# Patient Record
Sex: MALE | Race: WHITE | NOT HISPANIC OR LATINO | Employment: OTHER | URBAN - METROPOLITAN AREA
[De-identification: names, ages, dates, MRNs, and addresses within clinical notes are randomized per-mention and may not be internally consistent; named-entity substitution may affect disease eponyms.]

---

## 2023-01-19 PROBLEM — N42.9 PROSTATE DISORDER: Status: ACTIVE | Noted: 2023-01-19

## 2023-01-19 PROBLEM — N28.9 ABNORMAL RENAL FUNCTION: Status: ACTIVE | Noted: 2023-01-19

## 2023-01-19 PROBLEM — Z90.5 HISTORY OF NEPHRECTOMY: Status: ACTIVE | Noted: 2023-01-19

## 2023-01-19 PROBLEM — E78.00 HYPERCHOLESTEROLEMIA: Status: ACTIVE | Noted: 2023-01-19

## 2023-01-19 PROBLEM — J45.909 REACTIVE AIRWAY DISEASE (HHS-HCC): Status: ACTIVE | Noted: 2023-01-19

## 2023-01-19 PROBLEM — D75.89 MACROCYTOSIS: Status: ACTIVE | Noted: 2023-01-19

## 2023-01-19 PROBLEM — J20.9 ACUTE BRONCHITIS: Status: ACTIVE | Noted: 2023-01-19

## 2023-01-19 PROBLEM — I25.10 ARTERIOSCLEROSIS OF CORONARY ARTERY: Status: ACTIVE | Noted: 2023-01-19

## 2023-01-19 PROBLEM — R79.9 ABNORMAL BLOOD CHEMISTRY: Status: ACTIVE | Noted: 2023-01-19

## 2023-01-19 PROBLEM — N18.30 CHRONIC RENAL IMPAIRMENT, STAGE 3 (MODERATE) (MULTI): Status: ACTIVE | Noted: 2023-01-19

## 2023-01-19 PROBLEM — D64.9 ANEMIA: Status: ACTIVE | Noted: 2023-01-19

## 2023-01-19 PROBLEM — M10.9 ACUTE GOUT OF LEFT FOOT: Status: ACTIVE | Noted: 2023-01-19

## 2023-01-19 PROBLEM — S76.912A MUSCLE STRAIN OF THIGH, LEFT, INITIAL ENCOUNTER: Status: ACTIVE | Noted: 2023-01-19

## 2023-01-19 PROBLEM — M10.9 GOUT: Status: ACTIVE | Noted: 2023-01-19

## 2023-01-19 PROBLEM — N18.9 CRI (CHRONIC RENAL INSUFFICIENCY): Status: ACTIVE | Noted: 2023-01-19

## 2023-01-19 PROBLEM — I10 HYPERTENSION: Status: ACTIVE | Noted: 2023-01-19

## 2023-01-19 PROBLEM — M62.9 MUSCLE DISORDER: Status: ACTIVE | Noted: 2023-01-19

## 2023-01-19 PROBLEM — J06.9 VIRAL URI WITH COUGH: Status: ACTIVE | Noted: 2023-01-19

## 2023-01-19 PROBLEM — E55.9 VITAMIN D DEFICIENCY: Status: ACTIVE | Noted: 2023-01-19

## 2023-01-19 PROBLEM — I49.3 PVC (PREMATURE VENTRICULAR CONTRACTION): Status: ACTIVE | Noted: 2023-01-19

## 2023-01-19 PROBLEM — G40.909 SEIZURE DISORDER (MULTI): Status: ACTIVE | Noted: 2023-01-19

## 2023-01-19 PROBLEM — S51.819A FOREARM LACERATION: Status: ACTIVE | Noted: 2023-01-19

## 2023-01-19 PROBLEM — T14.8XXA MUSCLE TEAR: Status: ACTIVE | Noted: 2023-01-19

## 2023-01-19 PROBLEM — I49.9 ARRHYTHMIA, VENTRICULAR: Status: ACTIVE | Noted: 2023-01-19

## 2023-01-19 PROBLEM — E87.5 HYPERKALEMIA: Status: ACTIVE | Noted: 2023-01-19

## 2023-01-19 PROBLEM — I05.9 MITRAL VALVE DISEASE: Status: ACTIVE | Noted: 2023-01-19

## 2023-01-19 RX ORDER — PHENOL 1.4 %
1 AEROSOL, SPRAY (ML) MUCOUS MEMBRANE DAILY
COMMUNITY
Start: 2019-06-19

## 2023-01-19 RX ORDER — ACETAMINOPHEN 500 MG
500 TABLET ORAL AS NEEDED
COMMUNITY
Start: 2018-06-20

## 2023-01-19 RX ORDER — AMLODIPINE BESYLATE 5 MG/1
1 TABLET ORAL DAILY
COMMUNITY
Start: 2022-09-06 | End: 2024-02-23 | Stop reason: SDUPTHER

## 2023-01-19 RX ORDER — OXCARBAZEPINE 300 MG/1
TABLET, FILM COATED ORAL
COMMUNITY

## 2023-01-19 RX ORDER — ATORVASTATIN CALCIUM 80 MG/1
1 TABLET, FILM COATED ORAL NIGHTLY
COMMUNITY
Start: 2022-09-06 | End: 2024-02-23 | Stop reason: SDUPTHER

## 2023-02-22 PROBLEM — T14.8XXA WOUND OF SKIN: Status: ACTIVE | Noted: 2023-02-22

## 2023-02-22 RX ORDER — EZETIMIBE 10 MG/1
10 TABLET ORAL
COMMUNITY
End: 2024-02-20 | Stop reason: SDUPTHER

## 2023-02-22 RX ORDER — LISINOPRIL 5 MG/1
5 TABLET ORAL DAILY
COMMUNITY
End: 2023-11-21 | Stop reason: ALTCHOICE

## 2023-03-06 ENCOUNTER — APPOINTMENT (OUTPATIENT)
Dept: PRIMARY CARE | Facility: CLINIC | Age: 78
End: 2023-03-06
Payer: MEDICARE

## 2023-04-21 LAB
ALANINE AMINOTRANSFERASE (SGPT) (U/L) IN SER/PLAS: 34 U/L (ref 10–52)
ALBUMIN (G/DL) IN SER/PLAS: 4.1 G/DL (ref 3.4–5)
ALKALINE PHOSPHATASE (U/L) IN SER/PLAS: 63 U/L (ref 33–136)
ANION GAP IN SER/PLAS: 13 MMOL/L (ref 10–20)
APPEARANCE, URINE: CLEAR
ASPARTATE AMINOTRANSFERASE (SGOT) (U/L) IN SER/PLAS: 35 U/L (ref 9–39)
BASOPHILS (10*3/UL) IN BLOOD BY AUTOMATED COUNT: 0.05 X10E9/L (ref 0–0.1)
BASOPHILS/100 LEUKOCYTES IN BLOOD BY AUTOMATED COUNT: 0.8 % (ref 0–2)
BILIRUBIN TOTAL (MG/DL) IN SER/PLAS: 0.6 MG/DL (ref 0–1.2)
BILIRUBIN, URINE: NEGATIVE
BLOOD, URINE: NEGATIVE
CALCIDIOL (25 OH VITAMIN D3) (NG/ML) IN SER/PLAS: 47 NG/ML
CALCIUM (MG/DL) IN SER/PLAS: 9.3 MG/DL (ref 8.6–10.6)
CARBON DIOXIDE, TOTAL (MMOL/L) IN SER/PLAS: 27 MMOL/L (ref 21–32)
CHLORIDE (MMOL/L) IN SER/PLAS: 106 MMOL/L (ref 98–107)
CHOLESTEROL (MG/DL) IN SER/PLAS: 139 MG/DL (ref 0–199)
CHOLESTEROL IN HDL (MG/DL) IN SER/PLAS: 81.3 MG/DL
CHOLESTEROL/HDL RATIO: 1.7
COBALAMIN (VITAMIN B12) (PG/ML) IN SER/PLAS: 653 PG/ML (ref 211–911)
COLOR, URINE: YELLOW
CREATININE (MG/DL) IN SER/PLAS: 1.82 MG/DL (ref 0.5–1.3)
EOSINOPHILS (10*3/UL) IN BLOOD BY AUTOMATED COUNT: 0.12 X10E9/L (ref 0–0.4)
EOSINOPHILS/100 LEUKOCYTES IN BLOOD BY AUTOMATED COUNT: 2 % (ref 0–6)
ERYTHROCYTE DISTRIBUTION WIDTH (RATIO) BY AUTOMATED COUNT: 12.9 % (ref 11.5–14.5)
ERYTHROCYTE MEAN CORPUSCULAR HEMOGLOBIN CONCENTRATION (G/DL) BY AUTOMATED: 33 G/DL (ref 32–36)
ERYTHROCYTE MEAN CORPUSCULAR VOLUME (FL) BY AUTOMATED COUNT: 101 FL (ref 80–100)
ERYTHROCYTES (10*6/UL) IN BLOOD BY AUTOMATED COUNT: 4.02 X10E12/L (ref 4.5–5.9)
ESTIMATED AVERAGE GLUCOSE FOR HBA1C: 108 MG/DL
FERRITIN (UG/LL) IN SER/PLAS: 119 UG/L (ref 20–300)
GFR MALE: 38 ML/MIN/1.73M2
GLUCOSE (MG/DL) IN SER/PLAS: 88 MG/DL (ref 74–99)
GLUCOSE, URINE: NEGATIVE MG/DL
HEMATOCRIT (%) IN BLOOD BY AUTOMATED COUNT: 40.6 % (ref 41–52)
HEMOGLOBIN (G/DL) IN BLOOD: 13.4 G/DL (ref 13.5–17.5)
HEMOGLOBIN A1C/HEMOGLOBIN TOTAL IN BLOOD: 5.4 %
IMMATURE GRANULOCYTES/100 LEUKOCYTES IN BLOOD BY AUTOMATED COUNT: 0.2 % (ref 0–0.9)
IRON (UG/DL) IN SER/PLAS: 105 UG/DL (ref 35–150)
IRON BINDING CAPACITY (UG/DL) IN SER/PLAS: 272 UG/DL (ref 240–445)
IRON SATURATION (%) IN SER/PLAS: 39 % (ref 25–45)
KETONES, URINE: NEGATIVE MG/DL
LDL: 41 MG/DL (ref 0–99)
LEUKOCYTE ESTERASE, URINE: NEGATIVE
LEUKOCYTES (10*3/UL) IN BLOOD BY AUTOMATED COUNT: 6 X10E9/L (ref 4.4–11.3)
LYMPHOCYTES (10*3/UL) IN BLOOD BY AUTOMATED COUNT: 1.53 X10E9/L (ref 0.8–3)
LYMPHOCYTES/100 LEUKOCYTES IN BLOOD BY AUTOMATED COUNT: 25.4 % (ref 13–44)
MONOCYTES (10*3/UL) IN BLOOD BY AUTOMATED COUNT: 0.45 X10E9/L (ref 0.05–0.8)
MONOCYTES/100 LEUKOCYTES IN BLOOD BY AUTOMATED COUNT: 7.5 % (ref 2–10)
MUCUS, URINE: NORMAL /LPF
NEUTROPHILS (10*3/UL) IN BLOOD BY AUTOMATED COUNT: 3.86 X10E9/L (ref 1.6–5.5)
NEUTROPHILS/100 LEUKOCYTES IN BLOOD BY AUTOMATED COUNT: 64.1 % (ref 40–80)
NITRITE, URINE: NEGATIVE
NRBC (PER 100 WBCS) BY AUTOMATED COUNT: 0 /100 WBC (ref 0–0)
PH, URINE: 5 (ref 5–8)
PLATELETS (10*3/UL) IN BLOOD AUTOMATED COUNT: 235 X10E9/L (ref 150–450)
POTASSIUM (MMOL/L) IN SER/PLAS: 4.4 MMOL/L (ref 3.5–5.3)
PROSTATE SPECIFIC ANTIGEN,SCREEN: 1.06 NG/ML (ref 0–4)
PROTEIN TOTAL: 6.3 G/DL (ref 6.4–8.2)
PROTEIN, URINE: ABNORMAL MG/DL
RBC, URINE: NORMAL /HPF (ref 0–5)
SODIUM (MMOL/L) IN SER/PLAS: 142 MMOL/L (ref 136–145)
SPECIFIC GRAVITY, URINE: 1.01 (ref 1–1.03)
THYROTROPIN (MIU/L) IN SER/PLAS BY DETECTION LIMIT <= 0.05 MIU/L: 1.41 MIU/L (ref 0.44–3.98)
TRIGLYCERIDE (MG/DL) IN SER/PLAS: 82 MG/DL (ref 0–149)
UREA NITROGEN (MG/DL) IN SER/PLAS: 27 MG/DL (ref 6–23)
UROBILINOGEN, URINE: <2 MG/DL (ref 0–1.9)
VLDL: 16 MG/DL (ref 0–40)
WBC, URINE: 2 /HPF (ref 0–5)

## 2023-05-17 ENCOUNTER — TELEPHONE (OUTPATIENT)
Dept: PRIMARY CARE | Facility: CLINIC | Age: 78
End: 2023-05-17

## 2023-05-24 LAB
ALBUMIN (G/DL) IN SER/PLAS: 4.2 G/DL (ref 3.4–5)
ANION GAP IN SER/PLAS: 13 MMOL/L (ref 10–20)
APPEARANCE, URINE: CLEAR
BILIRUBIN, URINE: NEGATIVE
BLOOD, URINE: NEGATIVE
CALCIUM (MG/DL) IN SER/PLAS: 9.7 MG/DL (ref 8.6–10.6)
CARBON DIOXIDE, TOTAL (MMOL/L) IN SER/PLAS: 28 MMOL/L (ref 21–32)
CHLORIDE (MMOL/L) IN SER/PLAS: 106 MMOL/L (ref 98–107)
CHOLESTEROL (MG/DL) IN SER/PLAS: 149 MG/DL (ref 0–199)
CHOLESTEROL IN HDL (MG/DL) IN SER/PLAS: 101.3 MG/DL
CHOLESTEROL/HDL RATIO: 1.5
COLOR, URINE: YELLOW
CREATININE (MG/DL) IN SER/PLAS: 1.9 MG/DL (ref 0.5–1.3)
GFR MALE: 36 ML/MIN/1.73M2
GLUCOSE (MG/DL) IN SER/PLAS: 107 MG/DL (ref 74–99)
GLUCOSE, URINE: NEGATIVE MG/DL
KETONES, URINE: NEGATIVE MG/DL
LDL: 42 MG/DL (ref 0–99)
LEUKOCYTE ESTERASE, URINE: NEGATIVE
MUCUS, URINE: NORMAL /LPF
NITRITE, URINE: NEGATIVE
PH, URINE: 6 (ref 5–8)
PHOSPHATE (MG/DL) IN SER/PLAS: 4.5 MG/DL (ref 2.5–4.9)
POTASSIUM (MMOL/L) IN SER/PLAS: 4.9 MMOL/L (ref 3.5–5.3)
PROTEIN, URINE: ABNORMAL MG/DL
RBC, URINE: NORMAL /HPF (ref 0–5)
SODIUM (MMOL/L) IN SER/PLAS: 142 MMOL/L (ref 136–145)
SPECIFIC GRAVITY, URINE: 1.01 (ref 1–1.03)
TRIGLYCERIDE (MG/DL) IN SER/PLAS: 30 MG/DL (ref 0–149)
UREA NITROGEN (MG/DL) IN SER/PLAS: 37 MG/DL (ref 6–23)
UROBILINOGEN, URINE: <2 MG/DL (ref 0–1.9)
VLDL: 6 MG/DL (ref 0–40)
WBC, URINE: <1 /HPF (ref 0–5)

## 2023-09-22 LAB
ALBUMIN (G/DL) IN SER/PLAS: 4.2 G/DL (ref 3.4–5)
ALBUMIN (MG/L) IN URINE: 184.3 MG/L
ALBUMIN/CREATININE (UG/MG) IN URINE: 121.3 UG/MG CRT (ref 0–30)
ANION GAP IN SER/PLAS: 16 MMOL/L (ref 10–20)
APPEARANCE, URINE: CLEAR
BILIRUBIN, URINE: NEGATIVE
BLOOD, URINE: NEGATIVE
CALCIUM (MG/DL) IN SER/PLAS: 9.7 MG/DL (ref 8.6–10.6)
CARBON DIOXIDE, TOTAL (MMOL/L) IN SER/PLAS: 27 MMOL/L (ref 21–32)
CHLORIDE (MMOL/L) IN SER/PLAS: 106 MMOL/L (ref 98–107)
COLOR, URINE: ABNORMAL
CREATININE (MG/DL) IN SER/PLAS: 1.77 MG/DL (ref 0.5–1.3)
CREATININE (MG/DL) IN URINE: 152 MG/DL (ref 20–370)
GFR MALE: 39 ML/MIN/1.73M2
GLUCOSE (MG/DL) IN SER/PLAS: 115 MG/DL (ref 74–99)
GLUCOSE, URINE: NEGATIVE MG/DL
KETONES, URINE: NEGATIVE MG/DL
LEUKOCYTE ESTERASE, URINE: NEGATIVE
MUCUS, URINE: NORMAL /LPF
NITRITE, URINE: NEGATIVE
PH, URINE: 5 (ref 5–8)
PHOSPHATE (MG/DL) IN SER/PLAS: 3.1 MG/DL (ref 2.5–4.9)
POTASSIUM (MMOL/L) IN SER/PLAS: 4.7 MMOL/L (ref 3.5–5.3)
PROTEIN, URINE: ABNORMAL MG/DL
RBC, URINE: NORMAL /HPF (ref 0–5)
SODIUM (MMOL/L) IN SER/PLAS: 144 MMOL/L (ref 136–145)
SPECIFIC GRAVITY, URINE: 1.02 (ref 1–1.03)
SQUAMOUS EPITHELIAL CELLS, URINE: <1 /HPF
UREA NITROGEN (MG/DL) IN SER/PLAS: 28 MG/DL (ref 6–23)
UROBILINOGEN, URINE: <2 MG/DL (ref 0–1.9)
WBC, URINE: 1 /HPF (ref 0–5)

## 2023-11-17 RX ORDER — ALLOPURINOL 100 MG/1
2 TABLET ORAL 2 TIMES DAILY
COMMUNITY
Start: 2023-03-11

## 2023-11-21 ENCOUNTER — OFFICE VISIT (OUTPATIENT)
Dept: CARDIOLOGY | Facility: HOSPITAL | Age: 78
End: 2023-11-21
Payer: MEDICARE

## 2023-11-21 VITALS
HEIGHT: 66 IN | BODY MASS INDEX: 20.93 KG/M2 | WEIGHT: 130.2 LBS | HEART RATE: 53 BPM | SYSTOLIC BLOOD PRESSURE: 126 MMHG | DIASTOLIC BLOOD PRESSURE: 74 MMHG

## 2023-11-21 DIAGNOSIS — E78.00 HYPERCHOLESTEROLEMIA: ICD-10-CM

## 2023-11-21 DIAGNOSIS — Z87.891 FORMER SMOKER: ICD-10-CM

## 2023-11-21 DIAGNOSIS — I25.10 CORONARY ARTERY DISEASE INVOLVING NATIVE CORONARY ARTERY OF NATIVE HEART WITHOUT ANGINA PECTORIS: Primary | ICD-10-CM

## 2023-11-21 DIAGNOSIS — I10 PRIMARY HYPERTENSION: ICD-10-CM

## 2023-11-21 PROBLEM — T84.018S FAILED TOTAL HIP ARTHROPLASTY, SEQUELA: Status: ACTIVE | Noted: 2018-03-08

## 2023-11-21 PROBLEM — I34.0 MITRAL REGURGITATION: Status: ACTIVE | Noted: 2023-11-21

## 2023-11-21 PROBLEM — Z96.649 FAILED TOTAL HIP ARTHROPLASTY, SEQUELA: Status: ACTIVE | Noted: 2018-03-08

## 2023-11-21 PROBLEM — R29.6 RECURRENT FALLS: Status: ACTIVE | Noted: 2023-08-20

## 2023-11-21 PROBLEM — G40.909 EPILEPSY (MULTI): Status: ACTIVE | Noted: 2023-11-21

## 2023-11-21 PROBLEM — Z96.643 S/P BILATERAL REVISION OF TOTAL HIP ARTHROPLASTY: Status: ACTIVE | Noted: 2018-03-21

## 2023-11-21 PROBLEM — S01.01XA SCALP LACERATION: Status: ACTIVE | Noted: 2023-08-20

## 2023-11-21 PROCEDURE — 1160F RVW MEDS BY RX/DR IN RCRD: CPT | Performed by: INTERNAL MEDICINE

## 2023-11-21 PROCEDURE — 1159F MED LIST DOCD IN RCRD: CPT | Performed by: INTERNAL MEDICINE

## 2023-11-21 PROCEDURE — 99214 OFFICE O/P EST MOD 30 MIN: CPT | Performed by: INTERNAL MEDICINE

## 2023-11-21 PROCEDURE — 3078F DIAST BP <80 MM HG: CPT | Performed by: INTERNAL MEDICINE

## 2023-11-21 PROCEDURE — 93010 ELECTROCARDIOGRAM REPORT: CPT | Performed by: INTERNAL MEDICINE

## 2023-11-21 PROCEDURE — 93005 ELECTROCARDIOGRAM TRACING: CPT | Performed by: INTERNAL MEDICINE

## 2023-11-21 PROCEDURE — 3074F SYST BP LT 130 MM HG: CPT | Performed by: INTERNAL MEDICINE

## 2023-11-21 PROCEDURE — 1036F TOBACCO NON-USER: CPT | Performed by: INTERNAL MEDICINE

## 2023-11-21 ASSESSMENT — ENCOUNTER SYMPTOMS
LOSS OF SENSATION IN FEET: 0
DEPRESSION: 0
OCCASIONAL FEELINGS OF UNSTEADINESS: 0

## 2023-11-21 ASSESSMENT — PATIENT HEALTH QUESTIONNAIRE - PHQ9
2. FEELING DOWN, DEPRESSED OR HOPELESS: NOT AT ALL
1. LITTLE INTEREST OR PLEASURE IN DOING THINGS: NOT AT ALL
SUM OF ALL RESPONSES TO PHQ9 QUESTIONS 1 & 2: 0

## 2023-11-21 NOTE — PROGRESS NOTES
Primary Care Physician: John Dai MD  Date of Visit: 11/21/2023  1:00 PM EST  Location of visit: [facility]     Chief Complaint:   Chief Complaint   Patient presents with    Follow-up     6 month    Hypertension    Hyperlipidemia    Coronary Artery Disease        HPI / Summary:   Michael Camacho is a 78 y.o. male presents for followup.  He has no cardiac complaints.  He was hospitalized at [facility] in August after mechanical fall.  He is physically active and exercises 4 days a week walking and jogging for up to 1.5 miles in addition to using weights.  The patient denies chest pain, shortness of breath, palpitations, lightheadedness, syncope, orthopnea, paroxysmal nocturnal dyspnea, lower extremity edema, or bleeding problems.          Past Medical History:   Diagnosis Date    Personal history of other diseases of the nervous system and sense organs     History of seizure disorder        Past Surgical History:   Procedure Laterality Date    COLONOSCOPY  06/04/2013    Complete Colonoscopy    OTHER SURGICAL HISTORY  02/05/2015    Nephrectomy    SHOULDER SURGERY  06/04/2013    Shoulder Surgery Right    TOTAL HIP ARTHROPLASTY  06/04/2013    Hip Replacement          Social History:   reports that he quit smoking about 53 years ago. His smoking use included cigarettes. He has never used smokeless tobacco. He reports current alcohol use. He reports that he does not use drugs.     Family History:  family history includes Diabetes in his sister; acute myocardial infarction in his father and mother.      Allergies:  No Known Allergies    Outpatient Medications:  Current Outpatient Medications   Medication Instructions    acetaminophen (TYLENOL EXTRA STRENGTH) 500 mg, oral, As needed, Every 4 to 6 hours    allopurinol (Zyloprim) 100 mg tablet 2 tablets, oral, 2 times daily    amLODIPine (Norvasc) 5 mg tablet 1 tablet, oral, Daily    atorvastatin (Lipitor) 80 mg tablet 1 tablet, oral, Nightly    ezetimibe  "(ZETIA) 10 mg, oral, Every Day    multivitamin-min-iron-FA-vit K (Adults Multivitamin) 18 mg iron-400 mcg-25 mcg tablet 1 tablet, oral, Daily    OXcarbazepine (Trileptal) 300 mg tablet oral, Take 1.5 tablets every morning and 1.5 tablets at bedtime       Physical Exam:  Vitals:    11/21/23 1303   BP: 126/74   BP Location: Left arm   Patient Position: Sitting   BP Cuff Size: Adult   Pulse: 53   Weight: 59.1 kg (130 lb 3.2 oz)   Height: 1.676 m (5' 6\")     Wt Readings from Last 5 Encounters:   11/21/23 59.1 kg (130 lb 3.2 oz)   07/25/23 64.5 kg (142 lb 4 oz)   09/06/22 62.2 kg (137 lb 0.8 oz)   02/24/22 62.1 kg (137 lb)   12/01/21 61.7 kg (136 lb 0.2 oz)     Body mass index is 21.01 kg/m².   General: Well-developed well-nourished in no acute distress  HEENT: Normocephalic atraumatic  Neck: Supple, JVP is normal negative hepatojugular reflux 2+ carotid pulses without bruit  Pulmonary: Normal respiratory effort, clear to auscultation  Cardiovascular: No right ventricular heave, normal S1 and S2, no murmurs rubs or gallops  Abdomen: Soft nontender nondistended  Extremities: Warm without edema 2+ radial pulses bilaterally 2+ posterior tibial pulses bilaterally  Neurologic: Alert and oriented x3  Psychiatric: Normal mood and affect     Last Labs:  CMP:  Recent Labs     09/22/23  0639 05/24/23  0642 04/21/23  0636    142 142   K 4.7 4.9 4.4    106 106   CO2 27 28 27   ANIONGAP 16 13 13   BUN 28* 37* 27*   CREATININE 1.77* 1.90* 1.82*   GLUCOSE 115* 107* 88     Recent Labs     09/22/23  0639 05/24/23  0642 04/21/23  0636 10/21/22  0620 07/21/22  0640 07/21/22  0639 02/09/22  0634   ALBUMIN 4.2 4.2 4.1   < >  --    < > 4.2   ALKPHOS  --   --  63  --  62  --  56   ALT  --   --  34  --  44  --  45   AST  --   --  35  --  37  --  44*   BILITOT  --   --  0.6  --  0.5  --  0.5    < > = values in this interval not displayed.     CBC:  Recent Labs     04/21/23  0636 02/09/22  0634 02/19/21  0630   WBC 6.0 5.2 6.1   HGB " "13.4* 13.5 15.3   HCT 40.6* 41.0 45.5    211 252   * 100 100     COAG: No results for input(s): \"INR\", \"DDIMERVTE\" in the last 75527 hours.  HEME/ENDO:  Recent Labs     04/21/23  0636 02/09/22  0634 02/19/21  0630 02/04/19  0753 02/05/18  0000   FERRITIN 119  --   --   --  153   IRONSAT 39  --   --   --  31   TSH 1.41 1.61 1.96   < >  --    HGBA1C 5.4 5.7* 5.5   < >  --     < > = values in this interval not displayed.      CARDIAC: No results for input(s): \"LDH\", \"CKMB\", \"TROPHS\", \"BNP\" in the last 47194 hours.    No lab exists for component: \"CK\", \"CKMBP\"  Recent Labs     05/24/23  0642 04/21/23  0636 01/20/23  0632   CHOL 149 139 164   LDLF 42 41 77   .3 81.3 74.7   TRIG 30 82 63       Last Cardiology Tests:  ECG:  An electrocardiogram performed today that I reviewed shows sinus bradycardia and is otherwise normal.    Echo:  August 23, 2023  CONCLUSIONS:   - Exam indication: Recurrent falls   - The left ventricle is normal in size. There is mild concentric left ventricular   hypertrophy. Left ventricular systolic function is normal. EF = 65 ± 5% (2D   biplane)   - The right ventricle is normal in size. Right ventricular systolic function is   normal.       6/20/2018  CONCLUSIONS:   1. The left ventricular systolic function is normal with a 60-65% estimated ejection fraction.   2. Spectral Doppler shows an impaired relaxation pattern of left ventricular diastolic filling.   3. There is mild aortic valve regurgitation.       Cath:      Stress Test:  Stress Results:  No results found for this or any previous visit from the past 365 days.         Cardiac Imaging:        Assessment/Plan   Diagnoses and all orders for this visit:  Coronary artery disease involving native coronary artery of native heart without angina pectoris  -     ECG 12 lead (Clinic Performed)  Hypercholesterolemia  Primary hypertension  Former smoker  -     Vascular US abdominal aorta anuerysm AAA screening; Future    In " summary, Mr. Camacho is a pleasant, 78 year-old male with a past medical history significant for coronary atherosclerosis with a CAC score of 364 in 2014 with preserved LV function, mild to moderate mitral regurgitation, PVCs, and dyslipidemia, and chronic kidney disease with a solitary kidney.  He is asymptomatic from a cardiac perspective.  His most recent blood pressure and lipids are at goal.  He should continue his current cardiovascular medications.  Given his prior tobacco use I ordered an abdominal aortic ultrasound to screen for an aneurysm.  We will see him back in follow-up in 8 months.      Orders:  Orders Placed This Encounter   Procedures    ECG 12 lead (Clinic Performed)      Followup Appts:  Future Appointments   Date Time Provider Department Center   1/25/2024  1:00 PM John Dai MD [id number]           ____________________________________________________________  Moncho Watson MD  [facility]  [facility]

## 2023-12-03 LAB
ATRIAL RATE: 53 BPM
P AXIS: 76 DEGREES
P OFFSET: 190 MS
P ONSET: 128 MS
PR INTERVAL: 186 MS
Q ONSET: 221 MS
QRS COUNT: 9 BEATS
QRS DURATION: 82 MS
QT INTERVAL: 432 MS
QTC CALCULATION(BAZETT): 405 MS
QTC FREDERICIA: 414 MS
R AXIS: 30 DEGREES
T AXIS: 52 DEGREES
T OFFSET: 437 MS
VENTRICULAR RATE: 53 BPM

## 2024-01-16 ENCOUNTER — HOSPITAL ENCOUNTER (OUTPATIENT)
Dept: VASCULAR MEDICINE | Facility: HOSPITAL | Age: 79
Discharge: HOME | End: 2024-01-16
Payer: MEDICARE

## 2024-01-16 DIAGNOSIS — Z87.891 FORMER SMOKER: ICD-10-CM

## 2024-01-16 DIAGNOSIS — Z13.6 ENCOUNTER FOR SCREENING FOR CARDIOVASCULAR DISORDERS: ICD-10-CM

## 2024-01-16 PROCEDURE — 76706 US ABDL AORTA SCREEN AAA: CPT

## 2024-01-16 PROCEDURE — 76706 US ABDL AORTA SCREEN AAA: CPT | Performed by: SURGERY

## 2024-01-24 ENCOUNTER — OFFICE VISIT (OUTPATIENT)
Dept: PRIMARY CARE | Facility: CLINIC | Age: 79
End: 2024-01-24
Payer: MEDICARE

## 2024-01-24 VITALS
HEIGHT: 66 IN | SYSTOLIC BLOOD PRESSURE: 124 MMHG | DIASTOLIC BLOOD PRESSURE: 50 MMHG | HEART RATE: 73 BPM | BODY MASS INDEX: 21.61 KG/M2 | OXYGEN SATURATION: 94 % | WEIGHT: 134.48 LBS | RESPIRATION RATE: 16 BRPM

## 2024-01-24 DIAGNOSIS — N18.9 CHRONIC RENAL IMPAIRMENT, UNSPECIFIED CKD STAGE: ICD-10-CM

## 2024-01-24 DIAGNOSIS — Z00.00 ENCOUNTER FOR ANNUAL WELLNESS EXAM IN MEDICARE PATIENT: ICD-10-CM

## 2024-01-24 DIAGNOSIS — I10 PRIMARY HYPERTENSION: ICD-10-CM

## 2024-01-24 DIAGNOSIS — I25.10 CORONARY ARTERY DISEASE INVOLVING NATIVE CORONARY ARTERY OF NATIVE HEART WITHOUT ANGINA PECTORIS: Primary | ICD-10-CM

## 2024-01-24 DIAGNOSIS — F10.10 ALCOHOL ABUSE: ICD-10-CM

## 2024-01-24 PROCEDURE — 1159F MED LIST DOCD IN RCRD: CPT | Performed by: INTERNAL MEDICINE

## 2024-01-24 PROCEDURE — G0439 PPPS, SUBSEQ VISIT: HCPCS | Performed by: INTERNAL MEDICINE

## 2024-01-24 PROCEDURE — 3078F DIAST BP <80 MM HG: CPT | Performed by: INTERNAL MEDICINE

## 2024-01-24 PROCEDURE — 99214 OFFICE O/P EST MOD 30 MIN: CPT | Performed by: INTERNAL MEDICINE

## 2024-01-24 PROCEDURE — 3074F SYST BP LT 130 MM HG: CPT | Performed by: INTERNAL MEDICINE

## 2024-01-24 PROCEDURE — 1036F TOBACCO NON-USER: CPT | Performed by: INTERNAL MEDICINE

## 2024-01-24 PROCEDURE — 1160F RVW MEDS BY RX/DR IN RCRD: CPT | Performed by: INTERNAL MEDICINE

## 2024-01-24 NOTE — PROGRESS NOTES
Subjective   Reason for Visit: Michael Camacho is an 78 y.o. male here for a Medicare Wellness visit.      Michael is a pleasant 78-year-old male with history of coronary disease based on calcium score, solitary kidney, alcohol abuse, hypertension and epilepsy here for annual wellness.  Doing well.  Lives at home with wife.  Independent.  Exercises 5 days a week.  Eats a healthy diet.  He does admit to drinking wine, at times more than he should.  He had 1 hospitalization in August due to a fall in the setting of intoxication.  He has tried to cut down on his alcohol intake.  Otherwise no interval change.      All medications reviewed and reconciled by me the provider..  No use of controlled substances or opiates.    Family history, social history reviewed, no changes.    Patient does not smoke.      Patient hydrates adequately daily.  Eats a well-balanced healthy diet.     Exercises adequately including walking and doing weightbearing exercises.    Patient denies any difficulty with memory or cognition.     Denies any difficulty with hearing.  Patient does not wear hearing aids.       denies any loss of interest, no feeling of sadness, no lack of motivation.    Patient is independent in all ADLs and IADLs.  Independent bathing, dressing, walking.  Takes care of own finances, shopping and cooking.     End-of-life decision-making power of  reviewed with patient.               Patient Care Team:  John Dai MD as PCP - General  John Dai MD as PCP - [facility] Attributed Provider     Review of Systems  No fevers no chills, no unintentional weight changes.  No night sweats.    No URI symptoms.    No new or unusual headaches.    No cough no wheezing.    No chest pain or shortness of breath.  No orthopnea no PND.  No palpitations.    Appetite intact, no nausea vomiting diarrhea, no reflux-like symptoms.  No abdominal pain.  No dark stools.    No new joint pain.  No fatigue.  No weakness.    No heat or  "cold intolerance, constipation diarrhea, unintentional weight changes.    No change in memory  Objective   Vitals:  Blood pressure 124/50, pulse 73, resp. rate 16, height 1.676 m (5' 6\"), weight 61 kg (134 lb 7.7 oz), SpO2 94 %.  Calm coherent well-appearing.    Neck is supple with no tenderness, thyroid mobile soft with no nodules, oropharynx clear.  Sinuses nontender.    Breathing comfortably, clear to auscultation bilaterally.    Regular rate and rhythm, no murmur gallops or rubs, good distal pulses.  No edema.  No JVD.  No carotid bruit.    Abdomen is soft, nontender, normal bowel sounds.  No ascites.  No hepatosplenomegaly.    Upper and lower extremity joints intact with full active range of motion.  Strength is 5 out of 5 in upper and lower extremities.    Skin is grossly normal, moist.     Extremity warm, well-perfused, no clubbing or cyanosis.    Coherent, cognition intact, memory intact.  Gait intact.    No cervical, supraclavicular, axillary or inguinal lymphadenopathy.          Assessment/Plan   Problem List Items Addressed This Visit    None       Michael is a pleasant 70-year-old male here for annual wellness.  Doing well.    Alcohol use: Strongly encouraged him to reduce his alcohol intake to no more than 1 to 2 glass of wine per night.    Recent fall: Reviewed hospitalization course, no concern for seizures, this was a mechanical fall in setting of alcohol intoxication.  He has intact neurological exam, he exercises.  I think besides the alcohol intake he is not at fall risk.    Solitary kidney: Follows with Dr. Amador.  Has been stable.  Does not take any NSAIDs.    Coronary disease based on calcium score: Reviewed cardiology note, no changes.  He is physically active and exercises regularly.  He is on Lipitor 80.  Zetia.    Hypertension: At goal for age, continue amlodipine.    History of epilepsy: Follows with neurology at [facility], reviewed last note.  On Trileptal.    Health maintenance: To get RSV " vaccine at local pharmacy, otherwise immunization up-to-date.  Again encouraged him to reduce his alcohol intake.  Otherwise has a healthy lifestyle.  Labs up-to-date.

## 2024-02-16 DIAGNOSIS — I25.10 CORONARY ARTERY DISEASE INVOLVING NATIVE CORONARY ARTERY OF NATIVE HEART WITHOUT ANGINA PECTORIS: ICD-10-CM

## 2024-02-16 DIAGNOSIS — E78.00 HYPERCHOLESTEROLEMIA: Primary | ICD-10-CM

## 2024-02-20 RX ORDER — EZETIMIBE 10 MG/1
10 TABLET ORAL
Qty: 90 TABLET | Refills: 3 | Status: SHIPPED | OUTPATIENT
Start: 2024-02-20 | End: 2024-02-23 | Stop reason: SDUPTHER

## 2024-02-23 DIAGNOSIS — I25.10 CORONARY ARTERY DISEASE INVOLVING NATIVE CORONARY ARTERY OF NATIVE HEART WITHOUT ANGINA PECTORIS: ICD-10-CM

## 2024-02-23 DIAGNOSIS — E78.00 HYPERCHOLESTEROLEMIA: ICD-10-CM

## 2024-02-26 RX ORDER — EZETIMIBE 10 MG/1
10 TABLET ORAL
Qty: 90 TABLET | Refills: 3 | Status: SHIPPED | OUTPATIENT
Start: 2024-02-26 | End: 2025-02-25

## 2024-02-26 RX ORDER — AMLODIPINE BESYLATE 5 MG/1
5 TABLET ORAL DAILY
Qty: 90 TABLET | Refills: 3 | Status: SHIPPED | OUTPATIENT
Start: 2024-02-26 | End: 2025-02-25

## 2024-02-26 RX ORDER — ATORVASTATIN CALCIUM 80 MG/1
80 TABLET, FILM COATED ORAL NIGHTLY
Qty: 90 TABLET | Refills: 3 | Status: SHIPPED | OUTPATIENT
Start: 2024-02-26 | End: 2025-02-25

## 2024-02-29 ENCOUNTER — LAB (OUTPATIENT)
Dept: LAB | Facility: LAB | Age: 79
End: 2024-02-29
Payer: MEDICARE

## 2024-02-29 ENCOUNTER — OFFICE VISIT (OUTPATIENT)
Dept: PRIMARY CARE | Facility: HOSPITAL | Age: 79
End: 2024-02-29
Payer: MEDICARE

## 2024-02-29 VITALS
SYSTOLIC BLOOD PRESSURE: 134 MMHG | DIASTOLIC BLOOD PRESSURE: 70 MMHG | TEMPERATURE: 97.7 F | HEART RATE: 94 BPM | BODY MASS INDEX: 15.8 KG/M2 | WEIGHT: 133.8 LBS | HEIGHT: 77 IN | OXYGEN SATURATION: 97 %

## 2024-02-29 DIAGNOSIS — N18.32 CHRONIC RENAL IMPAIRMENT, STAGE 3B (MULTI): ICD-10-CM

## 2024-02-29 DIAGNOSIS — D64.9 ANEMIA, UNSPECIFIED TYPE: ICD-10-CM

## 2024-02-29 DIAGNOSIS — D12.6 ADENOMATOUS POLYP OF COLON, UNSPECIFIED PART OF COLON: ICD-10-CM

## 2024-02-29 DIAGNOSIS — I10 PRIMARY HYPERTENSION: Primary | ICD-10-CM

## 2024-02-29 DIAGNOSIS — E78.00 HYPERCHOLESTEROLEMIA: ICD-10-CM

## 2024-02-29 DIAGNOSIS — G40.019: ICD-10-CM

## 2024-02-29 DIAGNOSIS — I25.10 CORONARY ARTERY DISEASE INVOLVING NATIVE CORONARY ARTERY OF NATIVE HEART WITHOUT ANGINA PECTORIS: ICD-10-CM

## 2024-02-29 DIAGNOSIS — R79.89 ABNORMAL LFTS: ICD-10-CM

## 2024-02-29 PROBLEM — Z86.010 PERSONAL HISTORY OF COLONIC POLYPS: Status: ACTIVE | Noted: 2024-02-29

## 2024-02-29 PROBLEM — Z86.0100 PERSONAL HISTORY OF COLONIC POLYPS: Status: ACTIVE | Noted: 2024-02-29

## 2024-02-29 PROBLEM — E78.5 HYPERLIPIDEMIA: Status: ACTIVE | Noted: 2023-11-21

## 2024-02-29 LAB
ALBUMIN SERPL BCP-MCNC: 4.5 G/DL (ref 3.4–5)
ALP SERPL-CCNC: 84 U/L (ref 33–136)
ALT SERPL W P-5'-P-CCNC: 40 U/L (ref 10–52)
ANION GAP SERPL CALC-SCNC: 17 MMOL/L (ref 10–20)
AST SERPL W P-5'-P-CCNC: 40 U/L (ref 9–39)
BASOPHILS # BLD AUTO: 0.06 X10*3/UL (ref 0–0.1)
BASOPHILS NFR BLD AUTO: 0.9 %
BILIRUB SERPL-MCNC: 0.5 MG/DL (ref 0–1.2)
BUN SERPL-MCNC: 40 MG/DL (ref 6–23)
CALCIUM SERPL-MCNC: 10.2 MG/DL (ref 8.6–10.3)
CHLORIDE SERPL-SCNC: 102 MMOL/L (ref 98–107)
CO2 SERPL-SCNC: 25 MMOL/L (ref 21–32)
CREAT SERPL-MCNC: 1.8 MG/DL (ref 0.5–1.3)
EGFRCR SERPLBLD CKD-EPI 2021: 38 ML/MIN/1.73M*2
EOSINOPHIL # BLD AUTO: 0.08 X10*3/UL (ref 0–0.4)
EOSINOPHIL NFR BLD AUTO: 1.2 %
ERYTHROCYTE [DISTWIDTH] IN BLOOD BY AUTOMATED COUNT: 13.2 % (ref 11.5–14.5)
FERRITIN SERPL-MCNC: 158 NG/ML (ref 20–300)
GLUCOSE SERPL-MCNC: 101 MG/DL (ref 74–99)
HCT VFR BLD AUTO: 44.2 % (ref 41–52)
HGB BLD-MCNC: 14.7 G/DL (ref 13.5–17.5)
IMM GRANULOCYTES # BLD AUTO: 0.02 X10*3/UL (ref 0–0.5)
IMM GRANULOCYTES NFR BLD AUTO: 0.3 % (ref 0–0.9)
IRON SATN MFR SERPL: 43 % (ref 25–45)
IRON SERPL-MCNC: 122 UG/DL (ref 35–150)
LYMPHOCYTES # BLD AUTO: 2.01 X10*3/UL (ref 0.8–3)
LYMPHOCYTES NFR BLD AUTO: 29.8 %
MCH RBC QN AUTO: 32.5 PG (ref 26–34)
MCHC RBC AUTO-ENTMCNC: 33.3 G/DL (ref 32–36)
MCV RBC AUTO: 98 FL (ref 80–100)
MONOCYTES # BLD AUTO: 0.51 X10*3/UL (ref 0.05–0.8)
MONOCYTES NFR BLD AUTO: 7.6 %
NEUTROPHILS # BLD AUTO: 4.07 X10*3/UL (ref 1.6–5.5)
NEUTROPHILS NFR BLD AUTO: 60.2 %
NRBC BLD-RTO: 0 /100 WBCS (ref 0–0)
PLATELET # BLD AUTO: 289 X10*3/UL (ref 150–450)
POTASSIUM SERPL-SCNC: 4.4 MMOL/L (ref 3.5–5.3)
PROT SERPL-MCNC: 7.7 G/DL (ref 6.4–8.2)
RBC # BLD AUTO: 4.53 X10*6/UL (ref 4.5–5.9)
SODIUM SERPL-SCNC: 140 MMOL/L (ref 136–145)
TIBC SERPL-MCNC: 287 UG/DL (ref 240–445)
TRANSFERRIN SERPL-MCNC: 206 MG/DL (ref 200–360)
UIBC SERPL-MCNC: 165 UG/DL (ref 110–370)
VIT B12 SERPL-MCNC: 847 PG/ML (ref 211–911)
WBC # BLD AUTO: 6.8 X10*3/UL (ref 4.4–11.3)

## 2024-02-29 PROCEDURE — 82607 VITAMIN B-12: CPT

## 2024-02-29 PROCEDURE — 83550 IRON BINDING TEST: CPT

## 2024-02-29 PROCEDURE — 1160F RVW MEDS BY RX/DR IN RCRD: CPT | Performed by: INTERNAL MEDICINE

## 2024-02-29 PROCEDURE — 3078F DIAST BP <80 MM HG: CPT | Performed by: INTERNAL MEDICINE

## 2024-02-29 PROCEDURE — 83540 ASSAY OF IRON: CPT

## 2024-02-29 PROCEDURE — 1170F FXNL STATUS ASSESSED: CPT | Performed by: INTERNAL MEDICINE

## 2024-02-29 PROCEDURE — 82728 ASSAY OF FERRITIN: CPT

## 2024-02-29 PROCEDURE — 80053 COMPREHEN METABOLIC PANEL: CPT

## 2024-02-29 PROCEDURE — 99215 OFFICE O/P EST HI 40 MIN: CPT | Performed by: INTERNAL MEDICINE

## 2024-02-29 PROCEDURE — 1036F TOBACCO NON-USER: CPT | Performed by: INTERNAL MEDICINE

## 2024-02-29 PROCEDURE — 1159F MED LIST DOCD IN RCRD: CPT | Performed by: INTERNAL MEDICINE

## 2024-02-29 PROCEDURE — 3075F SYST BP GE 130 - 139MM HG: CPT | Performed by: INTERNAL MEDICINE

## 2024-02-29 PROCEDURE — 36415 COLL VENOUS BLD VENIPUNCTURE: CPT

## 2024-02-29 PROCEDURE — 84466 ASSAY OF TRANSFERRIN: CPT

## 2024-02-29 PROCEDURE — 85025 COMPLETE CBC W/AUTO DIFF WBC: CPT

## 2024-02-29 ASSESSMENT — ACTIVITIES OF DAILY LIVING (ADL)
DRESSING: INDEPENDENT
GROCERY_SHOPPING: INDEPENDENT
MANAGING_FINANCES: INDEPENDENT
BATHING: INDEPENDENT
TAKING_MEDICATION: INDEPENDENT
DOING_HOUSEWORK: INDEPENDENT

## 2024-02-29 ASSESSMENT — ENCOUNTER SYMPTOMS
LOSS OF SENSATION IN FEET: 0
DEPRESSION: 1
OCCASIONAL FEELINGS OF UNSTEADINESS: 1

## 2024-02-29 ASSESSMENT — PATIENT HEALTH QUESTIONNAIRE - PHQ9
2. FEELING DOWN, DEPRESSED OR HOPELESS: NOT AT ALL
1. LITTLE INTEREST OR PLEASURE IN DOING THINGS: NOT AT ALL
SUM OF ALL RESPONSES TO PHQ9 QUESTIONS 1 AND 2: 0
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
## 2024-03-01 PROBLEM — N28.9 ABNORMAL RENAL FUNCTION: Status: RESOLVED | Noted: 2023-01-19 | Resolved: 2024-03-01

## 2024-03-01 PROBLEM — N18.9 CRI (CHRONIC RENAL INSUFFICIENCY): Status: RESOLVED | Noted: 2023-01-19 | Resolved: 2024-03-01

## 2024-03-01 NOTE — ASSESSMENT & PLAN NOTE
>>ASSESSMENT AND PLAN FOR HYPERCHOLESTEROLEMIA WRITTEN ON 3/1/2024  4:47 PM BY KIM COREY MD    Last lipids at target

## 2024-03-02 DIAGNOSIS — R79.89 ABNORMAL LFTS: Primary | ICD-10-CM

## 2024-03-05 DIAGNOSIS — Z12.11 COLON CANCER SCREENING: ICD-10-CM

## 2024-03-05 RX ORDER — POLYETHYLENE GLYCOL 3350, SODIUM SULFATE ANHYDROUS, SODIUM BICARBONATE, SODIUM CHLORIDE, POTASSIUM CHLORIDE 236; 22.74; 6.74; 5.86; 2.97 G/4L; G/4L; G/4L; G/4L; G/4L
4000 POWDER, FOR SOLUTION ORAL ONCE
Qty: 4000 ML | Refills: 0 | Status: SHIPPED | OUTPATIENT
Start: 2024-03-05 | End: 2024-03-05

## 2024-04-26 ENCOUNTER — APPOINTMENT (OUTPATIENT)
Dept: GASTROENTEROLOGY | Facility: EXTERNAL LOCATION | Age: 79
End: 2024-04-26
Payer: MEDICARE

## 2024-05-03 ENCOUNTER — LAB (OUTPATIENT)
Dept: LAB | Facility: LAB | Age: 79
End: 2024-05-03
Payer: MEDICARE

## 2024-05-03 DIAGNOSIS — R79.89 ABNORMAL LFTS: ICD-10-CM

## 2024-05-03 DIAGNOSIS — R80.0 ISOLATED PROTEINURIA: ICD-10-CM

## 2024-05-03 DIAGNOSIS — N18.30 CHRONIC KIDNEY DISEASE, STAGE 3 UNSPECIFIED (MULTI): Primary | ICD-10-CM

## 2024-05-03 DIAGNOSIS — E78.49 OTHER HYPERLIPIDEMIA: ICD-10-CM

## 2024-05-03 DIAGNOSIS — I10 ESSENTIAL (PRIMARY) HYPERTENSION: ICD-10-CM

## 2024-05-03 LAB
ALBUMIN SERPL BCP-MCNC: 4.5 G/DL (ref 3.4–5)
ALP SERPL-CCNC: 83 U/L (ref 33–136)
ALT SERPL W P-5'-P-CCNC: 38 U/L (ref 10–52)
ANION GAP SERPL CALC-SCNC: 17 MMOL/L (ref 10–20)
APPEARANCE UR: CLEAR
AST SERPL W P-5'-P-CCNC: 37 U/L (ref 9–39)
BILIRUB UR STRIP.AUTO-MCNC: NEGATIVE MG/DL
BUN SERPL-MCNC: 28 MG/DL (ref 6–23)
CALCIUM SERPL-MCNC: 9.9 MG/DL (ref 8.6–10.6)
CHLORIDE SERPL-SCNC: 102 MMOL/L (ref 98–107)
CO2 SERPL-SCNC: 26 MMOL/L (ref 21–32)
COLOR UR: YELLOW
CREAT SERPL-MCNC: 1.8 MG/DL (ref 0.5–1.3)
CREAT UR-MCNC: 103.6 MG/DL (ref 20–370)
EGFRCR SERPLBLD CKD-EPI 2021: 38 ML/MIN/1.73M*2
GLUCOSE SERPL-MCNC: 92 MG/DL (ref 74–99)
GLUCOSE UR STRIP.AUTO-MCNC: NORMAL MG/DL
KETONES UR STRIP.AUTO-MCNC: NEGATIVE MG/DL
LEUKOCYTE ESTERASE UR QL STRIP.AUTO: NEGATIVE
NITRITE UR QL STRIP.AUTO: NEGATIVE
PH UR STRIP.AUTO: 6 [PH]
PHOSPHATE SERPL-MCNC: 4.3 MG/DL (ref 2.5–4.9)
POTASSIUM SERPL-SCNC: 4.7 MMOL/L (ref 3.5–5.3)
PROT UR STRIP.AUTO-MCNC: ABNORMAL MG/DL
PROT UR-ACNC: 70 MG/DL (ref 5–25)
PROT/CREAT UR: 0.68 MG/MG CREAT (ref 0–0.17)
RBC # UR STRIP.AUTO: NEGATIVE /UL
RBC #/AREA URNS AUTO: NORMAL /HPF
SODIUM SERPL-SCNC: 140 MMOL/L (ref 136–145)
SP GR UR STRIP.AUTO: 1.02
UROBILINOGEN UR STRIP.AUTO-MCNC: NORMAL MG/DL
WBC #/AREA URNS AUTO: NORMAL /HPF

## 2024-05-03 PROCEDURE — 84156 ASSAY OF PROTEIN URINE: CPT

## 2024-05-03 PROCEDURE — 80069 RENAL FUNCTION PANEL: CPT

## 2024-05-03 PROCEDURE — 82570 ASSAY OF URINE CREATININE: CPT

## 2024-05-03 PROCEDURE — 36415 COLL VENOUS BLD VENIPUNCTURE: CPT

## 2024-05-03 PROCEDURE — 84450 TRANSFERASE (AST) (SGOT): CPT

## 2024-05-03 PROCEDURE — 84460 ALANINE AMINO (ALT) (SGPT): CPT

## 2024-05-03 PROCEDURE — 84075 ASSAY ALKALINE PHOSPHATASE: CPT

## 2024-05-03 PROCEDURE — 81001 URINALYSIS AUTO W/SCOPE: CPT

## 2024-05-07 ENCOUNTER — OFFICE VISIT (OUTPATIENT)
Dept: GASTROENTEROLOGY | Facility: EXTERNAL LOCATION | Age: 79
End: 2024-05-07
Payer: MEDICARE

## 2024-05-07 DIAGNOSIS — Z12.11 SPECIAL SCREENING FOR MALIGNANT NEOPLASMS, COLON: Primary | ICD-10-CM

## 2024-05-07 DIAGNOSIS — K57.30 DIVERTICULOSIS OF LARGE INTESTINE WITHOUT DIVERTICULITIS: ICD-10-CM

## 2024-05-07 DIAGNOSIS — D12.6 ADENOMATOUS POLYP OF COLON, UNSPECIFIED PART OF COLON: ICD-10-CM

## 2024-05-07 DIAGNOSIS — Z86.010 PERSONAL HISTORY OF COLONIC POLYPS: ICD-10-CM

## 2024-05-07 DIAGNOSIS — D12.2 BENIGN NEOPLASM OF ASCENDING COLON: ICD-10-CM

## 2024-05-07 DIAGNOSIS — K64.0 FIRST DEGREE HEMORRHOIDS: ICD-10-CM

## 2024-05-07 PROCEDURE — 1159F MED LIST DOCD IN RCRD: CPT | Performed by: INTERNAL MEDICINE

## 2024-05-07 PROCEDURE — 88305 TISSUE EXAM BY PATHOLOGIST: CPT

## 2024-05-07 PROCEDURE — 1160F RVW MEDS BY RX/DR IN RCRD: CPT | Performed by: INTERNAL MEDICINE

## 2024-05-07 PROCEDURE — 45385 COLONOSCOPY W/LESION REMOVAL: CPT | Performed by: INTERNAL MEDICINE

## 2024-05-07 PROCEDURE — 88305 TISSUE EXAM BY PATHOLOGIST: CPT | Performed by: STUDENT IN AN ORGANIZED HEALTH CARE EDUCATION/TRAINING PROGRAM

## 2024-05-08 ENCOUNTER — LAB REQUISITION (OUTPATIENT)
Dept: LAB | Facility: HOSPITAL | Age: 79
End: 2024-05-08
Payer: MEDICARE

## 2024-05-16 LAB
LABORATORY COMMENT REPORT: NORMAL
PATH REPORT.FINAL DX SPEC: NORMAL
PATH REPORT.GROSS SPEC: NORMAL
PATH REPORT.RELEVANT HX SPEC: NORMAL
PATH REPORT.TOTAL CANCER: NORMAL

## 2024-06-10 ENCOUNTER — LAB (OUTPATIENT)
Dept: LAB | Facility: LAB | Age: 79
End: 2024-06-10
Payer: MEDICARE

## 2024-06-10 ENCOUNTER — OFFICE VISIT (OUTPATIENT)
Dept: PRIMARY CARE | Facility: HOSPITAL | Age: 79
End: 2024-06-10
Payer: MEDICARE

## 2024-06-10 VITALS
DIASTOLIC BLOOD PRESSURE: 60 MMHG | TEMPERATURE: 97 F | BODY MASS INDEX: 16.41 KG/M2 | WEIGHT: 138.4 LBS | OXYGEN SATURATION: 98 % | HEART RATE: 64 BPM | SYSTOLIC BLOOD PRESSURE: 120 MMHG

## 2024-06-10 DIAGNOSIS — E78.00 HYPERCHOLESTEROLEMIA: ICD-10-CM

## 2024-06-10 DIAGNOSIS — R80.0 ISOLATED PROTEINURIA: ICD-10-CM

## 2024-06-10 DIAGNOSIS — L85.8 KERATOACANTHOMA: ICD-10-CM

## 2024-06-10 DIAGNOSIS — N18.32 STAGE 3B CHRONIC KIDNEY DISEASE (MULTI): ICD-10-CM

## 2024-06-10 DIAGNOSIS — E55.9 VITAMIN D DEFICIENCY, UNSPECIFIED: ICD-10-CM

## 2024-06-10 DIAGNOSIS — I10 PRIMARY HYPERTENSION: ICD-10-CM

## 2024-06-10 DIAGNOSIS — E78.00 HYPERCHOLESTEROLEMIA: Primary | ICD-10-CM

## 2024-06-10 DIAGNOSIS — I10 ESSENTIAL (PRIMARY) HYPERTENSION: ICD-10-CM

## 2024-06-10 DIAGNOSIS — N18.30 CHRONIC KIDNEY DISEASE, STAGE 3 UNSPECIFIED (MULTI): Primary | ICD-10-CM

## 2024-06-10 LAB
25(OH)D3 SERPL-MCNC: 69 NG/ML (ref 30–100)
ALBUMIN SERPL BCP-MCNC: 4.4 G/DL (ref 3.4–5)
ANION GAP SERPL CALC-SCNC: 17 MMOL/L (ref 10–20)
APPEARANCE UR: CLEAR
BASOPHILS # BLD AUTO: 0.05 X10*3/UL (ref 0–0.1)
BASOPHILS NFR BLD AUTO: 0.9 %
BILIRUB UR STRIP.AUTO-MCNC: NEGATIVE MG/DL
BUN SERPL-MCNC: 32 MG/DL (ref 6–23)
CALCIUM SERPL-MCNC: 9.6 MG/DL (ref 8.6–10.6)
CHLORIDE SERPL-SCNC: 103 MMOL/L (ref 98–107)
CHOLEST SERPL-MCNC: 118 MG/DL (ref 0–199)
CHOLESTEROL/HDL RATIO: 2
CO2 SERPL-SCNC: 24 MMOL/L (ref 21–32)
COLOR UR: YELLOW
CREAT SERPL-MCNC: 1.89 MG/DL (ref 0.5–1.3)
CREAT UR-MCNC: 125.1 MG/DL (ref 20–370)
CREAT UR-MCNC: 125.1 MG/DL (ref 20–370)
EGFRCR SERPLBLD CKD-EPI 2021: 36 ML/MIN/1.73M*2
EOSINOPHIL # BLD AUTO: 0.07 X10*3/UL (ref 0–0.4)
EOSINOPHIL NFR BLD AUTO: 1.3 %
ERYTHROCYTE [DISTWIDTH] IN BLOOD BY AUTOMATED COUNT: 12.6 % (ref 11.5–14.5)
GLUCOSE SERPL-MCNC: 97 MG/DL (ref 74–99)
GLUCOSE UR STRIP.AUTO-MCNC: NORMAL MG/DL
HCT VFR BLD AUTO: 40.5 % (ref 41–52)
HDLC SERPL-MCNC: 57.8 MG/DL
HGB BLD-MCNC: 13.4 G/DL (ref 13.5–17.5)
IMM GRANULOCYTES # BLD AUTO: 0.05 X10*3/UL (ref 0–0.5)
IMM GRANULOCYTES NFR BLD AUTO: 0.9 % (ref 0–0.9)
KETONES UR STRIP.AUTO-MCNC: NEGATIVE MG/DL
LDLC SERPL CALC-MCNC: 45 MG/DL
LEUKOCYTE ESTERASE UR QL STRIP.AUTO: ABNORMAL
LYMPHOCYTES # BLD AUTO: 1.26 X10*3/UL (ref 0.8–3)
LYMPHOCYTES NFR BLD AUTO: 23 %
MCH RBC QN AUTO: 32.6 PG (ref 26–34)
MCHC RBC AUTO-ENTMCNC: 33.1 G/DL (ref 32–36)
MCV RBC AUTO: 99 FL (ref 80–100)
MICROALBUMIN UR-MCNC: 97.6 MG/L
MICROALBUMIN/CREAT UR: 78 UG/MG CREAT
MONOCYTES # BLD AUTO: 0.37 X10*3/UL (ref 0.05–0.8)
MONOCYTES NFR BLD AUTO: 6.7 %
NEUTROPHILS # BLD AUTO: 3.69 X10*3/UL (ref 1.6–5.5)
NEUTROPHILS NFR BLD AUTO: 67.2 %
NITRITE UR QL STRIP.AUTO: NEGATIVE
NON HDL CHOLESTEROL: 60 MG/DL (ref 0–149)
NRBC BLD-RTO: 0 /100 WBCS (ref 0–0)
PH UR STRIP.AUTO: 6 [PH]
PHOSPHATE SERPL-MCNC: 4.1 MG/DL (ref 2.5–4.9)
PLATELET # BLD AUTO: 238 X10*3/UL (ref 150–450)
POTASSIUM SERPL-SCNC: 4.8 MMOL/L (ref 3.5–5.3)
PROT UR STRIP.AUTO-MCNC: ABNORMAL MG/DL
PROT UR-ACNC: 26 MG/DL (ref 5–25)
PROT/CREAT UR: 0.21 MG/MG CREAT (ref 0–0.17)
PTH-INTACT SERPL-MCNC: 32.6 PG/ML (ref 18.5–88)
RBC # BLD AUTO: 4.11 X10*6/UL (ref 4.5–5.9)
RBC # UR STRIP.AUTO: NEGATIVE /UL
RBC #/AREA URNS AUTO: NORMAL /HPF
SODIUM SERPL-SCNC: 139 MMOL/L (ref 136–145)
SP GR UR STRIP.AUTO: 1.02
TRIGL SERPL-MCNC: 78 MG/DL (ref 0–149)
UROBILINOGEN UR STRIP.AUTO-MCNC: NORMAL MG/DL
VLDL: 16 MG/DL (ref 0–40)
WBC # BLD AUTO: 5.5 X10*3/UL (ref 4.4–11.3)
WBC #/AREA URNS AUTO: NORMAL /HPF

## 2024-06-10 PROCEDURE — 84156 ASSAY OF PROTEIN URINE: CPT

## 2024-06-10 PROCEDURE — 82043 UR ALBUMIN QUANTITATIVE: CPT

## 2024-06-10 PROCEDURE — 85025 COMPLETE CBC W/AUTO DIFF WBC: CPT

## 2024-06-10 PROCEDURE — 81001 URINALYSIS AUTO W/SCOPE: CPT

## 2024-06-10 PROCEDURE — 36415 COLL VENOUS BLD VENIPUNCTURE: CPT

## 2024-06-10 PROCEDURE — 99214 OFFICE O/P EST MOD 30 MIN: CPT | Performed by: INTERNAL MEDICINE

## 2024-06-10 PROCEDURE — 80061 LIPID PANEL: CPT

## 2024-06-10 PROCEDURE — 82570 ASSAY OF URINE CREATININE: CPT

## 2024-06-10 PROCEDURE — 82306 VITAMIN D 25 HYDROXY: CPT

## 2024-06-10 PROCEDURE — 80069 RENAL FUNCTION PANEL: CPT

## 2024-06-10 PROCEDURE — 83970 ASSAY OF PARATHORMONE: CPT

## 2024-06-10 RX ORDER — LISINOPRIL 10 MG/1
10 TABLET ORAL DAILY
COMMUNITY
Start: 2024-05-08

## 2024-06-10 NOTE — ASSESSMENT & PLAN NOTE
>>ASSESSMENT AND PLAN FOR HYPERCHOLESTEROLEMIA WRITTEN ON 6/10/2024  1:00 PM BY KIM COREY MD    Fasting lipids at target.  Continue current medications and repeat fasting blood profile in 6 months.

## 2024-06-10 NOTE — ASSESSMENT & PLAN NOTE
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  He was referred to dermatology for excision.  He also has a benign-appearing seborrheic keratosis on his left clavicle.  He was reassured.

## 2024-06-10 NOTE — ASSESSMENT & PLAN NOTE
Blood pressure is currently well-controlled.  Antihypertensive management as per his nephrologist.  He recently switched to lisinopril and a comprehensive metabolic panel is due now.

## 2024-06-10 NOTE — ASSESSMENT & PLAN NOTE
Fasting lipids at target.  Continue current medications and repeat fasting blood profile in 6 months.

## 2024-06-10 NOTE — PROGRESS NOTES
Chief Complaint:   Follow-up (3 months follow up check under rt arm pit  skin tag)     History Of Present Illness:    Michael Camacho is a 79 y.o. male with active medical problems outlined below who presents for blood pressure check.    INITIAL VISIT 3/29/24  Raúl has a history of coronary artery disease, hyperlipidemia, hypertension, gout, chronic kidney disease stage III, status post nephrectomy, who presents to establish care and update his health maintenance.  He had a fall in August.  Testing from his hospital stay demonstrated a high blood alcohol content.  He is trying to cut back on his alcohol intake.  He has a history of seizures managed with oxcarbazepine.  He sees a neurologist on a regular basis.  He exercises 4 to 5 days/week.  He feels well when he exercises.  He denies chest pain or shortness of breath with exertion.  He completed an echocardiogram on 8/23/2023 demonstrating normal ejection fraction.  He has a history of colon polyps and is due for colonoscopy now.  He has questions about PSA screening.  He had evidence of macrocytic anemia on blood work from his hospitalization.  He was unaware of that diagnosis.  He believes that he was recommended to B12 supplement in the past.    1/16/24 Abdominal US - no aneurysm  CT Calcium 3/2/09:  364     Echo:  August 23, 2023  CONCLUSIONS:   - Exam indication: Recurrent falls   - The left ventricle is normal in size. There is mild concentric left ventricular   hypertrophy. Left ventricular systolic function is normal. EF = 65 ± 5% (2D   biplane)   - The right ventricle is normal in size. Right ventricular systolic function is   normal.   -Mild to moderate MR    INTERVAL HISTORY 6/10/24  Michael is feeling well.  He is exercising at the [employer] 4-5d per week, [employer] and indoor track.  His energy level and physical endurance are good.  He denies chest pain or shortness of breath with physical exertion.  He does occasionally feel tired.  He  recently met with his nephrologist who made adjustments to his antihypertensives.  He is uncertain about the change, but it appears he was switched from amlodipine to lisinopril.  He is due for follow-up blood work now.  He endorses increased urine frequency in proportion to his fluid intake.  He does have occasional nocturia.  His symptoms are not bothersome.  He has a history of bilateral carpal tunnel syndrome diagnosed by his neurologist.  He is scheduled for bilateral carpal tunnel release later this summer.  He has a hyperpigmented spot on his left clavicle that he would like to have examined.  He also has a lesion on his right axilla that he would like to have examined.         Patient Active Problem List   Diagnosis    Abnormal blood chemistry    Anemia    Arrhythmia, ventricular    Coronary artery disease    Stage 3b chronic kidney disease (Multi)    Forearm laceration    Gout    History of nephrectomy    Hypercholesterolemia    Hyperkalemia    Hypertension    Macrocytosis    Mitral valve disease    Muscle disorder    Muscle strain of thigh, left, initial encounter    Muscle tear    Prostate disorder    PVC (premature ventricular contraction)    Seizure disorder (Multi)    Reactive airway disease (HHS-HCC)    Viral URI with cough    Vitamin D deficiency    Acute gout of left foot    Acute bronchitis    Wound of skin    Adhesive capsulitis of shoulder    Disorder of bursae and tendons in shoulder region    Failed total hip arthroplasty, sequela    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 Every 4 to 6 hours, Disp: , Rfl:     allopurinol (Zyloprim) 100 mg tablet, Take 2 tablets (200 mg) by mouth 2 times a day., Disp: , Rfl:     amLODIPine (Norvasc) 5 mg tablet, Take 1 tablet (5 mg) by mouth once daily., Disp: 90 tablet, Rfl: 3    atorvastatin (Lipitor) 80 mg tablet, Take 1 tablet (80 mg) by mouth once daily at bedtime., Disp: 90 tablet, Rfl: 3    ezetimibe (Zetia) 10 mg tablet, Take 1 tablet (10 mg) by mouth once every day., Disp: 90 tablet, Rfl: 3    lisinopril 10 mg tablet, Take 1 tablet (10 mg) by mouth once daily., Disp: , Rfl:     multivitamin-min-iron-FA-vit K (Adults Multivitamin) 18 mg iron-400 mcg-25 mcg tablet, Take 1 tablet by mouth once daily., Disp: , Rfl:     OXcarbazepine (Trileptal) 300 mg tablet, Take by mouth. Take 1.5 tablets every morning and 1.5 tablets at bedtime, Disp: , Rfl:   Patient has no known allergies.  Social History     Tobacco Use    Smoking status: Former     Current packs/day: 0.00     Types: Cigarettes     Start date: 1960     Quit date: 1970     Years since quittin.4    Smokeless tobacco: Never   Vaping Use    Vaping status: Never Used   Substance Use Topics    Alcohol use: Yes     Comment: occasional glass wine    Drug use: Never         All pertinent aspects of medical and surgical history were reviewed and updated in chart    Review of Systems   Pertinent positives in review of systems outlined above.  Complete ROS otherwise negative.        Last Recorded Vitals:  Patient Vitals for the past 24 hrs:   BP Temp Pulse SpO2 Weight   06/10/24 1120 120/60 -- -- -- --   06/10/24 1100 126/77 36.1 °C (97 °F) 64 98 % 62.8 kg (138 lb 6.4 oz)          Physical Exam  HENT:      Mouth/Throat:      Pharynx: Oropharynx is clear.   Eyes:       Extraocular Movements: Extraocular movements intact.      Conjunctiva/sclera: Conjunctivae normal.      Pupils: Pupils are equal, round, and reactive to light.   Cardiovascular:      Rate and Rhythm: Normal rate and regular rhythm.      Pulses: Normal pulses.      Heart sounds: Normal heart sounds.   Pulmonary:      Effort: Pulmonary effort is normal.      Breath sounds: Normal breath sounds.   Musculoskeletal:      Right lower leg: No edema.      Left lower leg: No edema.             Assessment/Plan   Problem List Items Addressed This Visit       Stage 3b chronic kidney disease (Multi)     Kidney function stable over the past year, however microalbuminuria has increased.  Michael was recently switched to lisinopril.         Relevant Orders    CBC and Auto Differential    Hypercholesterolemia - Primary     Fasting lipids at target.  Continue current medications and repeat fasting blood profile in 6 months.         Relevant Orders    Lipid Panel    Hypertension     Blood pressure is currently well-controlled.  Antihypertensive management as per his nephrologist.  He recently switched to lisinopril and a comprehensive metabolic panel is due now.         Keratoacanthoma     Also has a large keratinized keratoacanthoma in the skin in his right axilla.  He was referred to dermatology for excision.  He also has a benign-appearing seborrheic keratosis on his left clavicle.  He was reassured.         Relevant Orders    Referral to Dermatology       A total of 30 minutes was spent reviewing the chart and recent testing and discussing plan of care.         Luis E Kat MD

## 2024-06-16 DIAGNOSIS — D64.9 ANEMIA, UNSPECIFIED TYPE: ICD-10-CM

## 2024-06-16 DIAGNOSIS — N18.32 STAGE 3B CHRONIC KIDNEY DISEASE (MULTI): Primary | ICD-10-CM

## 2024-07-17 ENCOUNTER — OFFICE VISIT (OUTPATIENT)
Dept: CARDIOLOGY | Facility: HOSPITAL | Age: 79
End: 2024-07-17
Payer: MEDICARE

## 2024-07-17 VITALS
BODY MASS INDEX: 16.13 KG/M2 | SYSTOLIC BLOOD PRESSURE: 113 MMHG | WEIGHT: 136 LBS | DIASTOLIC BLOOD PRESSURE: 56 MMHG | HEART RATE: 56 BPM

## 2024-07-17 DIAGNOSIS — I10 PRIMARY HYPERTENSION: ICD-10-CM

## 2024-07-17 DIAGNOSIS — N18.32 STAGE 3B CHRONIC KIDNEY DISEASE (MULTI): ICD-10-CM

## 2024-07-17 DIAGNOSIS — I25.10 CORONARY ARTERY DISEASE INVOLVING NATIVE CORONARY ARTERY OF NATIVE HEART WITHOUT ANGINA PECTORIS: Primary | ICD-10-CM

## 2024-07-17 DIAGNOSIS — E78.00 HYPERCHOLESTEROLEMIA: ICD-10-CM

## 2024-07-17 PROCEDURE — 93005 ELECTROCARDIOGRAM TRACING: CPT | Performed by: INTERNAL MEDICINE

## 2024-07-17 PROCEDURE — 1036F TOBACCO NON-USER: CPT | Performed by: INTERNAL MEDICINE

## 2024-07-17 PROCEDURE — 1160F RVW MEDS BY RX/DR IN RCRD: CPT | Performed by: INTERNAL MEDICINE

## 2024-07-17 PROCEDURE — 99214 OFFICE O/P EST MOD 30 MIN: CPT | Performed by: INTERNAL MEDICINE

## 2024-07-17 PROCEDURE — 3078F DIAST BP <80 MM HG: CPT | Performed by: INTERNAL MEDICINE

## 2024-07-17 PROCEDURE — G2211 COMPLEX E/M VISIT ADD ON: HCPCS | Performed by: INTERNAL MEDICINE

## 2024-07-17 PROCEDURE — 3074F SYST BP LT 130 MM HG: CPT | Performed by: INTERNAL MEDICINE

## 2024-07-17 PROCEDURE — 1159F MED LIST DOCD IN RCRD: CPT | Performed by: INTERNAL MEDICINE

## 2024-07-17 ASSESSMENT — ENCOUNTER SYMPTOMS
DEPRESSION: 0
LOSS OF SENSATION IN FEET: 0
OCCASIONAL FEELINGS OF UNSTEADINESS: 0
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
## 2024-07-18 LAB
ATRIAL RATE: 56 BPM
P AXIS: 84 DEGREES
P OFFSET: 177 MS
P ONSET: 124 MS
PR INTERVAL: 196 MS
Q ONSET: 222 MS
QRS COUNT: 10 BEATS
QRS DURATION: 80 MS
QT INTERVAL: 464 MS
QTC CALCULATION(BAZETT): 447 MS
QTC FREDERICIA: 453 MS
R AXIS: 12 DEGREES
T AXIS: 26 DEGREES
T OFFSET: 454 MS
VENTRICULAR RATE: 56 BPM

## 2024-07-30 ENCOUNTER — APPOINTMENT (OUTPATIENT)
Dept: DERMATOLOGY | Facility: CLINIC | Age: 79
End: 2024-07-30
Payer: MEDICARE

## 2024-07-30 DIAGNOSIS — D18.01 HEMANGIOMA OF SKIN: ICD-10-CM

## 2024-07-30 DIAGNOSIS — L82.1 SEBORRHEIC KERATOSIS: ICD-10-CM

## 2024-07-30 DIAGNOSIS — D48.5 NEOPLASM OF UNCERTAIN BEHAVIOR OF SKIN: Primary | ICD-10-CM

## 2024-07-30 DIAGNOSIS — L57.8 DIFFUSE PHOTODAMAGE OF SKIN: ICD-10-CM

## 2024-07-30 DIAGNOSIS — L57.0 ACTINIC KERATOSIS: ICD-10-CM

## 2024-07-30 DIAGNOSIS — L81.4 LENTIGO: ICD-10-CM

## 2024-07-30 DIAGNOSIS — D22.5 MELANOCYTIC NEVUS OF TRUNK: ICD-10-CM

## 2024-07-30 PROCEDURE — 99203 OFFICE O/P NEW LOW 30 MIN: CPT | Performed by: DERMATOLOGY

## 2024-07-30 PROCEDURE — 11301 SHAVE SKIN LESION 0.6-1.0 CM: CPT | Performed by: DERMATOLOGY

## 2024-07-30 PROCEDURE — 17004 DESTROY PREMAL LESIONS 15/>: CPT | Performed by: DERMATOLOGY

## 2024-07-30 PROCEDURE — 11102 TANGNTL BX SKIN SINGLE LES: CPT | Performed by: DERMATOLOGY

## 2024-07-30 PROCEDURE — 11302 SHAVE SKIN LESION 1.1-2.0 CM: CPT | Performed by: DERMATOLOGY

## 2024-07-30 PROCEDURE — 1159F MED LIST DOCD IN RCRD: CPT | Performed by: DERMATOLOGY

## 2024-07-30 ASSESSMENT — DERMATOLOGY PATIENT ASSESSMENT
DO YOU USE A TANNING BED: NO
DO YOU HAVE ANY NEW OR CHANGING LESIONS: NO

## 2024-07-30 ASSESSMENT — DERMATOLOGY QUALITY OF LIFE (QOL) ASSESSMENT: ARE THERE EXCLUSIONS OR EXCEPTIONS FOR THE QUALITY OF LIFE ASSESSMENT: NO

## 2024-08-01 LAB
LABORATORY COMMENT REPORT: NORMAL
PATH REPORT.FINAL DX SPEC: NORMAL
PATH REPORT.GROSS SPEC: NORMAL
PATH REPORT.MICROSCOPIC SPEC OTHER STN: NORMAL
PATH REPORT.RELEVANT HX SPEC: NORMAL
PATH REPORT.TOTAL CANCER: NORMAL

## 2024-08-01 NOTE — PROGRESS NOTES
Subjective     Michael Camacho is a 79 y.o. male who presents for the following: Skin Exam.  The patient and his wife report the brown spot underneath his right arm has been present for over 1 year and has increased in size and gotten darker in color, but it does not hurt, itch, or bleed.  He denies any other new, changing, or concerning skin lesions; no bleeding, itching, or burning lesions.      Review of Systems:  No other skin or systemic complaints other than what is documented elsewhere in the note.    The following portions of the chart were reviewed this encounter and updated as appropriate:       Skin Cancer History  No skin cancer on file.    Specialty Problems          Dermatology Problems    Wound of skin    Keratoacanthoma       Past Dermatologic / Past Relevant Medical History:    No history of atypical nevi or skin cancer    Family History:    No family history of melanoma or skin cancer    Social History:    The patient states he is retired from working as the  of [employer] and lives in [facility] on [address], and he and his wife state they are close friends with  and Sherrie Braga    Allergies:  Patient has no known allergies.    Current Medications / CAM's:    Current Outpatient Medications:     acetaminophen (Tylenol Extra Strength) 500 mg tablet, Take 1 tablet (500 mg) by mouth if needed. Every 4 to 6 hours, Disp: , Rfl:     allopurinol (Zyloprim) 100 mg tablet, Take 2 tablets (200 mg) by mouth 2 times a day., Disp: , Rfl:     amLODIPine (Norvasc) 5 mg tablet, Take 1 tablet (5 mg) by mouth once daily. (Patient not taking: Reported on 7/17/2024), Disp: 90 tablet, Rfl: 3    atorvastatin (Lipitor) 80 mg tablet, Take 1 tablet (80 mg) by mouth once daily at bedtime., Disp: 90 tablet, Rfl: 3    ezetimibe (Zetia) 10 mg tablet, Take 1 tablet (10 mg) by mouth once every day., Disp: 90 tablet, Rfl: 3    lisinopril 10 mg tablet, Take 1 tablet (10 mg) by mouth once daily., Disp:  , Rfl:     multivitamin-min-iron-FA-vit K (Adults Multivitamin) 18 mg iron-400 mcg-25 mcg tablet, Take 1 tablet by mouth once daily., Disp: , Rfl:     OXcarbazepine (Trileptal) 300 mg tablet, Take by mouth. Take 1.5 tablets every morning and 1.5 tablets at bedtime, Disp: , Rfl:      Objective   Well appearing patient in no apparent distress; mood and affect are within normal limits.    A full examination was performed including scalp, face, eyes, ears, nose, lips, neck, chest, axillae, abdomen, back, bilateral upper extremities, and bilateral lower extremities. All findings within normal limits unless otherwise noted below.    Assessment/Plan   1. Neoplasm of uncertain behavior of skin (3)  Right Anterior Axilla  8 mm erythematous, scaly papule           Shave removal    Lesion length (cm):  0.8  Margin per side (cm):  0  Lesion diameter (cm):  0.8  Informed consent: discussed and consent obtained    Timeout: patient name, date of birth, surgical site, and procedure verified    Procedure prep:  Patient was prepped and draped  Anesthesia: the lesion was anesthetized in a standard fashion    Anesthetic:  1% lidocaine w/ epinephrine 1-100,000 local infiltration  Instrument used: flexible razor blade    Hemostasis achieved with: aluminum chloride    Outcome: patient tolerated procedure well    Post-procedure details: sterile dressing applied and wound care instructions given    Dressing type: bandage and petrolatum      Staff Communication: Dermatology Local Anesthesia: 1 % Lidocaine / Epinephrine - Amount:0.5ml    Specimen 1 - Dermatopathology- DERM LAB  Differential Diagnosis: VK v SK v SCC  Check Margins Yes/No?:    Comments:    Dermpath Lab: Routine Histopathology (formalin-fixed tissue)    Right Mid-Upper Back  1 cm pink, scaly papule          Lesion biopsy  Type of biopsy: tangential    Informed consent: discussed and consent obtained    Timeout: patient name, date of birth, surgical site, and procedure verified     Procedure prep:  Patient was prepped and draped  Anesthesia: the lesion was anesthetized in a standard fashion    Anesthetic:  1% lidocaine w/ epinephrine 1-100,000 local infiltration  Instrument used: flexible razor blade    Hemostasis achieved with: aluminum chloride    Outcome: patient tolerated procedure well    Post-procedure details: wound care instructions given      Staff Communication: Dermatology Local Anesthesia: 1 % Lidocaine / Epinephrine - Amount:0.5ml    Specimen 2 - Dermatopathology- DERM LAB  Differential Diagnosis: BCC v SCCIS v SK  Check Margins Yes/No?:    Comments:    Dermpath Lab: Routine Histopathology (formalin-fixed tissue)    Left Lateral Lower Back  8 mm dark brown pigmented, asymmetric macule with an asymmetric pigment network and irregular borders           Shave removal    Lesion length (cm):  0.8  Margin per side (cm):  0.2  Lesion diameter (cm):  1.2  Informed consent: discussed and consent obtained    Timeout: patient name, date of birth, surgical site, and procedure verified    Procedure prep:  Patient was prepped and draped  Anesthesia: the lesion was anesthetized in a standard fashion    Anesthetic:  1% lidocaine w/ epinephrine 1-100,000 local infiltration  Instrument used: flexible razor blade    Hemostasis achieved with: aluminum chloride    Outcome: patient tolerated procedure well    Post-procedure details: sterile dressing applied and wound care instructions given    Dressing type: bandage and petrolatum      Staff Communication: Dermatology Local Anesthesia: 1 % Lidocaine / Epinephrine - Amount:0.5ml    Specimen 3 - Dermatopathology- DERM LAB  Differential Diagnosis: DN  Check Margins Yes/No?:    Comments:    Dermpath Lab: Routine Histopathology (formalin-fixed tissue)    2. Actinic keratosis (15)  Head - Anterior (Face) (15)  Scattered on the patient's face, there are multiple erythematous, gritty, scaly macules     Actinic Keratoses -scattered on face.  The pre-cancerous  nature of these lesions and treatment options were discussed with the patient today.  At this time, I recommend treatment with liquid nitrogen cryotherapy.  The patient expressed understanding, is in agreement with this plan, and wishes to proceed with cryotherapy today.    Destr of lesion - Head - Anterior (Face) (15)  Complexity: simple    Destruction method: cryotherapy    Informed consent: discussed and consent obtained    Lesion destroyed using liquid nitrogen: Yes    Cryotherapy cycles:  1  Outcome: patient tolerated procedure well with no complications    Post-procedure details: wound care instructions given      3. Melanocytic nevus of trunk  Scattered on the patient's face, neck, trunk, and extremities, there are multiple small, round- to oval-shaped, brown-pigmented and pink-colored, symmetric, uniform-appearing macules and dome-shaped papules    Clinically benign- to slightly atypical-appearing nevi - the clinically benign- to slightly atypical-appearing nature of the remainder of the patient's nevi was discussed with the patient today.  None of the patient's nevi, with the exception of the 2 noted above, meet threshold for biopsy today.  I emphasized the importance of performing monthly self-skin exams using the ABCDs of monitoring moles, which were reviewed with the patient today and an informational hand-out provided.  I also emphasized the importance of sun avoidance and sun protection with daily sunscreen use.    4. Seborrheic keratosis  Scattered on the patient's face, neck, trunk, and extremities, there are multiple tan- to light brown-colored, hyperkeratotic, stuck-on appearing papules of varying size and shape    Seborrheic Keratoses - the benign nature of these lesions was discussed with the patient today and reassurance provided.  No treatment is medically indicated for these lesions at this time.    5. Hemangioma of skin  Scattered on the patient's face, neck, trunk, and extremities, there are  multiple small, round, cherry red- to purplish-colored, symmetric, uniform, vascular-appearing macules and papules    Cherry Angiomas - the benign nature of these vascular lesions was discussed with the patient today and reassurance provided.  No treatment is medically indicated for these lesions at this time.    6. Lentigo  Photodistributed  Multiple tan- to light brown-colored, round- to oval-shaped, symmetric and uniform-appearing macules and small patches consistent with lentigines scattered in sun-exposed areas.    Solar Lentigines and photodamage.  The clinically benign-appearing nature of these lesions and their relation to chronic sun exposure were discussed with the patient today and reassurance provided.  None of these lesions meet threshold for biopsy today, and thus no treatment is medically indicated for these lesions at this time.  The signs and symptoms of skin cancer were reviewed and the patient was advised to practice sun protection and sun avoidance, use daily sunscreen, and perform regular self skin exams.  The patient was instructed to monitor these lesions for any changes, such as in size, shape, or color, or associated symptoms and to call our office to schedule a return visit for re-evaluation if any such changes or symptoms are noticed in the future.  The patient expressed understanding and is in agreement with this plan.    7. Diffuse photodamage of skin  Photodistributed  Diffuse photodamage with actinic changes with telangiectasia and mottled pigmentation in sun-exposed areas.    Photodamage.  The signs and symptoms of skin cancer were reviewed and the patient was advised to practice sun protection and sun avoidance, use daily sunscreen, and perform regular self skin exams.  Sun protection was discussed, including avoiding the mid-day sun, wearing a sunscreen with SPF at least 50, and stressing the need for reapplication of sunscreen and applying more than they think they need.

## 2024-10-29 DIAGNOSIS — M10.9 GOUT, UNSPECIFIED CAUSE, UNSPECIFIED CHRONICITY, UNSPECIFIED SITE: Primary | ICD-10-CM

## 2024-10-31 RX ORDER — ALLOPURINOL 100 MG/1
200 TABLET ORAL 2 TIMES DAILY
Qty: 120 TABLET | Refills: 1 | Status: SHIPPED | OUTPATIENT
Start: 2024-10-31

## 2024-11-12 DIAGNOSIS — M10.9 GOUT, UNSPECIFIED CAUSE, UNSPECIFIED CHRONICITY, UNSPECIFIED SITE: ICD-10-CM

## 2024-11-12 RX ORDER — ALLOPURINOL 100 MG/1
200 TABLET ORAL 2 TIMES DAILY
Qty: 120 TABLET | Refills: 1 | OUTPATIENT
Start: 2024-11-12

## 2024-11-18 ENCOUNTER — APPOINTMENT (OUTPATIENT)
Dept: DERMATOLOGY | Facility: CLINIC | Age: 79
End: 2024-11-18
Payer: MEDICARE

## 2024-12-11 ENCOUNTER — LAB (OUTPATIENT)
Dept: LAB | Facility: LAB | Age: 79
End: 2024-12-11
Payer: MEDICARE

## 2024-12-11 ENCOUNTER — APPOINTMENT (OUTPATIENT)
Dept: PRIMARY CARE | Facility: CLINIC | Age: 79
End: 2024-12-11
Payer: MEDICARE

## 2024-12-11 ENCOUNTER — HOSPITAL ENCOUNTER (OUTPATIENT)
Dept: RADIOLOGY | Facility: HOSPITAL | Age: 79
Discharge: HOME | End: 2024-12-11
Payer: MEDICARE

## 2024-12-11 VITALS
OXYGEN SATURATION: 98 % | BODY MASS INDEX: 15.56 KG/M2 | TEMPERATURE: 97.5 F | SYSTOLIC BLOOD PRESSURE: 146 MMHG | HEART RATE: 56 BPM | WEIGHT: 131.2 LBS | DIASTOLIC BLOOD PRESSURE: 74 MMHG

## 2024-12-11 DIAGNOSIS — M25.561 ACUTE PAIN OF RIGHT KNEE: ICD-10-CM

## 2024-12-11 DIAGNOSIS — R79.89 ABNORMAL LFTS: ICD-10-CM

## 2024-12-11 DIAGNOSIS — I10 PRIMARY HYPERTENSION: ICD-10-CM

## 2024-12-11 DIAGNOSIS — R63.4 UNEXPLAINED WEIGHT LOSS: ICD-10-CM

## 2024-12-11 DIAGNOSIS — R63.4 UNEXPLAINED WEIGHT LOSS: Primary | ICD-10-CM

## 2024-12-11 DIAGNOSIS — D64.9 ANEMIA, UNSPECIFIED TYPE: ICD-10-CM

## 2024-12-11 DIAGNOSIS — N18.32 STAGE 3B CHRONIC KIDNEY DISEASE (MULTI): ICD-10-CM

## 2024-12-11 DIAGNOSIS — N40.0 BENIGN PROSTATIC HYPERPLASIA, UNSPECIFIED WHETHER LOWER URINARY TRACT SYMPTOMS PRESENT: ICD-10-CM

## 2024-12-11 LAB
ALBUMIN SERPL BCP-MCNC: 4.5 G/DL (ref 3.4–5)
ALP SERPL-CCNC: 76 U/L (ref 33–136)
ALT SERPL W P-5'-P-CCNC: 53 U/L (ref 10–52)
ANION GAP SERPL CALC-SCNC: 13 MMOL/L (ref 10–20)
AST SERPL W P-5'-P-CCNC: 41 U/L (ref 9–39)
BASOPHILS # BLD AUTO: 0.04 X10*3/UL (ref 0–0.1)
BASOPHILS NFR BLD AUTO: 0.7 %
BILIRUB SERPL-MCNC: 0.4 MG/DL (ref 0–1.2)
BUN SERPL-MCNC: 39 MG/DL (ref 6–23)
CALCIUM SERPL-MCNC: 10 MG/DL (ref 8.6–10.3)
CHLORIDE SERPL-SCNC: 109 MMOL/L (ref 98–107)
CO2 SERPL-SCNC: 24 MMOL/L (ref 21–32)
CREAT SERPL-MCNC: 1.84 MG/DL (ref 0.5–1.3)
CREAT UR-MCNC: 76.1 MG/DL (ref 20–370)
EGFRCR SERPLBLD CKD-EPI 2021: 37 ML/MIN/1.73M*2
EOSINOPHIL # BLD AUTO: 0.12 X10*3/UL (ref 0–0.4)
EOSINOPHIL NFR BLD AUTO: 2 %
ERYTHROCYTE [DISTWIDTH] IN BLOOD BY AUTOMATED COUNT: 13.7 % (ref 11.5–14.5)
GLUCOSE SERPL-MCNC: 108 MG/DL (ref 74–99)
HCT VFR BLD AUTO: 41 % (ref 41–52)
HGB BLD-MCNC: 13.2 G/DL (ref 13.5–17.5)
IMM GRANULOCYTES # BLD AUTO: 0.02 X10*3/UL (ref 0–0.5)
IMM GRANULOCYTES NFR BLD AUTO: 0.3 % (ref 0–0.9)
LYMPHOCYTES # BLD AUTO: 1.82 X10*3/UL (ref 0.8–3)
LYMPHOCYTES NFR BLD AUTO: 29.8 %
MCH RBC QN AUTO: 31.5 PG (ref 26–34)
MCHC RBC AUTO-ENTMCNC: 32.2 G/DL (ref 32–36)
MCV RBC AUTO: 98 FL (ref 80–100)
MICROALBUMIN UR-MCNC: 86.8 MG/L
MICROALBUMIN/CREAT UR: 114.1 UG/MG CREAT
MONOCYTES # BLD AUTO: 0.42 X10*3/UL (ref 0.05–0.8)
MONOCYTES NFR BLD AUTO: 6.9 %
NEUTROPHILS # BLD AUTO: 3.68 X10*3/UL (ref 1.6–5.5)
NEUTROPHILS NFR BLD AUTO: 60.3 %
NRBC BLD-RTO: 0 /100 WBCS (ref 0–0)
PLATELET # BLD AUTO: 256 X10*3/UL (ref 150–450)
POTASSIUM SERPL-SCNC: 5.5 MMOL/L (ref 3.5–5.3)
PROT SERPL-MCNC: 7.1 G/DL (ref 6.4–8.2)
PSA SERPL-MCNC: 1.08 NG/ML
RBC # BLD AUTO: 4.19 X10*6/UL (ref 4.5–5.9)
SODIUM SERPL-SCNC: 140 MMOL/L (ref 136–145)
TSH SERPL-ACNC: 1.61 MIU/L (ref 0.44–3.98)
WBC # BLD AUTO: 6.1 X10*3/UL (ref 4.4–11.3)

## 2024-12-11 PROCEDURE — 86803 HEPATITIS C AB TEST: CPT

## 2024-12-11 PROCEDURE — 71046 X-RAY EXAM CHEST 2 VIEWS: CPT

## 2024-12-11 PROCEDURE — 84443 ASSAY THYROID STIM HORMONE: CPT

## 2024-12-11 PROCEDURE — 86038 ANTINUCLEAR ANTIBODIES: CPT

## 2024-12-11 PROCEDURE — 82570 ASSAY OF URINE CREATININE: CPT

## 2024-12-11 PROCEDURE — 86235 NUCLEAR ANTIGEN ANTIBODY: CPT

## 2024-12-11 PROCEDURE — 85025 COMPLETE CBC W/AUTO DIFF WBC: CPT

## 2024-12-11 PROCEDURE — 84153 ASSAY OF PSA TOTAL: CPT

## 2024-12-11 PROCEDURE — 3077F SYST BP >= 140 MM HG: CPT | Performed by: INTERNAL MEDICINE

## 2024-12-11 PROCEDURE — 99214 OFFICE O/P EST MOD 30 MIN: CPT | Performed by: INTERNAL MEDICINE

## 2024-12-11 PROCEDURE — 86015 ACTIN ANTIBODY EACH: CPT

## 2024-12-11 PROCEDURE — 86225 DNA ANTIBODY NATIVE: CPT

## 2024-12-11 PROCEDURE — 86256 FLUORESCENT ANTIBODY TITER: CPT

## 2024-12-11 PROCEDURE — 3078F DIAST BP <80 MM HG: CPT | Performed by: INTERNAL MEDICINE

## 2024-12-11 PROCEDURE — 80053 COMPREHEN METABOLIC PANEL: CPT

## 2024-12-11 PROCEDURE — 1159F MED LIST DOCD IN RCRD: CPT | Performed by: INTERNAL MEDICINE

## 2024-12-11 PROCEDURE — 71046 X-RAY EXAM CHEST 2 VIEWS: CPT | Performed by: RADIOLOGY

## 2024-12-11 PROCEDURE — 36415 COLL VENOUS BLD VENIPUNCTURE: CPT

## 2024-12-11 PROCEDURE — 86706 HEP B SURFACE ANTIBODY: CPT

## 2024-12-11 PROCEDURE — 73562 X-RAY EXAM OF KNEE 3: CPT | Mod: RT

## 2024-12-11 PROCEDURE — 73562 X-RAY EXAM OF KNEE 3: CPT | Mod: RIGHT SIDE | Performed by: RADIOLOGY

## 2024-12-11 PROCEDURE — 82043 UR ALBUMIN QUANTITATIVE: CPT

## 2024-12-11 PROCEDURE — 1160F RVW MEDS BY RX/DR IN RCRD: CPT | Performed by: INTERNAL MEDICINE

## 2024-12-11 NOTE — PATIENT INSTRUCTIONS
Blood work and urine test now     Chest xray and Xray of knees    Schedule with Dr. James in 3mo

## 2024-12-11 NOTE — PROGRESS NOTES
Chief Complaint:   Follow-up (He would like to talk about weight loss)     History Of Present Illness:    Michael Camacho is a 79 y.o. male with active medical problems outlined below who presents for blood pressure check.       INITIAL VISIT 3/29/24  Raúl has a history of coronary artery disease, hyperlipidemia, hypertension, gout, chronic kidney disease stage III, status post nephrectomy, who presents to establish care and update his health maintenance.  He had a fall in August.  Testing from his hospital stay demonstrated a high blood alcohol content.  He is trying to cut back on his alcohol intake.  He has a history of seizures managed with oxcarbazepine.  He sees a neurologist on a regular basis.  He exercises 4 to 5 days/week.  He feels well when he exercises.  He denies chest pain or shortness of breath with exertion.  He completed an echocardiogram on 8/23/2023 demonstrating normal ejection fraction.  He has a history of colon polyps and is due for colonoscopy now.  He has questions about PSA screening.  He had evidence of macrocytic anemia on blood work from his hospitalization.  He was unaware of that diagnosis.  He believes that he was recommended to B12 supplement in the past.    -1/16/24 Abdominal US - no aneurysm  -CT Calcium 3/2/09:  364   -Echo: 8/23/23  CONCLUSIONS:   - Exam indication: Recurrent falls   - The left ventricle is normal in size. There is mild concentric left ventricular   hypertrophy. Left ventricular systolic function is normal. EF = 65 ± 5% (2D   biplane)   - The right ventricle is normal in size. Right ventricular systolic function is   normal.   -Mild to moderate MR    INTERVAL HISTORY 6/10/24  Michael is feeling well.  He is exercising at the [employer] 4-5d per week, [employer] at [place] and indoor track.  His energy level and physical endurance are good.  He denies chest pain or shortness of breath with physical exertion.  He does occasionally feel tired.  He recently met  with his nephrologist who made adjustments to his antihypertensives.  He is uncertain about the change, but it appears he was switched from amlodipine to lisinopril.  He is due for follow-up blood work now.  He endorses increased urine frequency in proportion to his fluid intake.  He does have occasional nocturia.  His symptoms are not bothersome.  He has a history of bilateral carpal tunnel syndrome diagnosed by his neurologist.  He is scheduled for bilateral carpal tunnel release later this summer.  He has a hyperpigmented spot on his left clavicle that he would like to have examined.  He also has a lesion on his right axilla that he would like to have examined.      INTERVAL HISTORY 12/11/24  Valdo met with his cardiologist in June - no adjustments to medication or testing were recommended   He me with his nephrologist over the past 6 months and amlodipine was discontinued.   Carpal tunnel surgery in [facility], and planning for right side in January  He was evaluated by his neurologist in October, and no changes to his treatment plan were made  He is concerned about weight loss.  According to his office weights, he is down 3 pounds in 12 months.   Now having right knee pain - tender over medial joint.  No injury, not red or swollen.  He is very active lifting weights at the gym.       Patient Active Problem List   Diagnosis    Abnormal blood chemistry    Anemia    Arrhythmia, ventricular    Coronary artery disease    Stage 3b chronic kidney disease (Multi)    Forearm laceration    Gout    History of nephrectomy    Hypercholesterolemia    Hyperkalemia    Hypertension    Macrocytosis    Mitral valve disease    Muscle disorder    Muscle strain of thigh, left, initial encounter    Muscle tear    Prostate disorder    PVC (premature ventricular contraction)    Seizure disorder (Multi)    Reactive airway disease (HHS-HCC)    Viral URI with cough    Vitamin D deficiency    Acute gout of left foot    Acute bronchitis     Wound of skin    Adhesive capsulitis of shoulder    Disorder of bursae and tendons in shoulder region    Failed total hip arthroplasty, sequela    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, intractable, without status epilepticus (Multi)    Primary localized osteoarthrosis of shoulder region    Recurrent falls    S/p bilateral revision of total hip arthroplasty    Scalp laceration    Secondary localized osteoarthrosis, shoulder region    Shoulder pain    Synovitis and tenosynovitis, unspecified    Hyperlipidemia    Pure hypercholesterolemia    Mitral regurgitation    Epilepsy    Personal history of colonic polyps    Keratoacanthoma    Unexplained weight loss    Benign prostatic hyperplasia       Current Outpatient Medications:     acetaminophen (Tylenol Extra Strength) 500 mg tablet, Take 1 tablet (500 mg) by mouth if needed. Every 4 to 6 hours, Disp: , Rfl:     allopurinol (Zyloprim) 100 mg tablet, Take 2 tablets (200 mg) by mouth 2 times a day., Disp: 120 tablet, Rfl: 1    atorvastatin (Lipitor) 80 mg tablet, Take 1 tablet (80 mg) by mouth once daily at bedtime., Disp: 90 tablet, Rfl: 3    ezetimibe (Zetia) 10 mg tablet, Take 1 tablet (10 mg) by mouth once every day., Disp: 90 tablet, Rfl: 3    lisinopril 10 mg tablet, Take 1 tablet (10 mg) by mouth once daily., Disp: , Rfl:     multivitamin-min-iron-FA-vit K (Adults Multivitamin) 18 mg iron-400 mcg-25 mcg tablet, Take 1 tablet by mouth once daily., Disp: , Rfl:     OXcarbazepine (Trileptal) 300 mg tablet, Take by mouth. Take 1.5 tablets every morning and 1.5 tablets at bedtime, Disp: , Rfl:     amLODIPine (Norvasc) 5 mg tablet, Take 1 tablet (5 mg) by mouth once daily. (Patient not taking: Reported on 7/17/2024), Disp: 90 tablet, Rfl: 3  Patient has no known allergies.  Social History     Tobacco Use    Smoking status: Former     Current packs/day: 0.00     Types: Cigarettes     Start date: 1/1/1960     Quit  date: 1970     Years since quittin.9    Smokeless tobacco: Never   Vaping Use    Vaping status: Never Used   Substance Use Topics    Alcohol use: Yes     Comment: occasional glass wine    Drug use: Never         All pertinent aspects of medical and surgical history were reviewed and updated in chart    Review of Systems   Pertinent positives in review of systems outlined above.  Complete ROS otherwise negative.      Last Recorded Vitals:  Patient Vitals for the past 24 hrs:   BP Temp Temp src Pulse SpO2 Weight   24 0919 146/74 36.4 °C (97.5 °F) Temporal 56 98 % 59.5 kg (131 lb 3.2 oz)          Physical Exam  HENT:      Mouth/Throat:      Pharynx: Oropharynx is clear.   Eyes:      Extraocular Movements: Extraocular movements intact.      Conjunctiva/sclera: Conjunctivae normal.      Pupils: Pupils are equal, round, and reactive to light.   Cardiovascular:      Rate and Rhythm: Normal rate and regular rhythm.   Pulmonary:      Effort: Pulmonary effort is normal.   Musculoskeletal:      Comments: R. Knee - normal joint stability, no effusion, no erythema, pain at medial knee joint.    Neurological:      General: No focal deficit present.             Assessment/Plan   Problem List Items Addressed This Visit       Anemia     Macrocytic anemia present on labs over the past year.  B12 normal.  Will repeat CBC now         Relevant Orders    CBC and Auto Differential (Completed)    Stage 3b chronic kidney disease (Multi)     Will check CMP and urine microalbumin now          Relevant Orders    Comprehensive Metabolic Panel (Completed)    Albumin-Creatinine Ratio, Urine Random (Completed)    Hypertension     Off of amlodipine and blood pressure increased slightly.  To follow up with nephrology.           Relevant Orders    Comprehensive Metabolic Panel (Completed)    TSH with reflex to Free T4 if abnormal (Completed)    Unexplained weight loss - Primary     Will check CMP, CBC TSH, PSA, UA and CXR.  Colonoscopy  on 5/7/24 - 3 adenomas.  Total weight loss according to office notes is 3 pounds in 12 mo          Relevant Orders    CBC and Auto Differential (Completed)    Comprehensive Metabolic Panel (Completed)    XR chest 2 views    Benign prostatic hyperplasia     PSA now.           Relevant Orders    Prostate Specific Antigen (Completed)     Other Visit Diagnoses       Acute pain of right knee        Relevant Orders    XR knee right 3 views    Referral to Orthopaedic Surgery            A total of 30 minutes was spent reviewing the chart and recent testing and discussing plan of care.         Luis E Kat MD

## 2024-12-12 ENCOUNTER — TELEPHONE (OUTPATIENT)
Dept: PRIMARY CARE | Facility: CLINIC | Age: 79
End: 2024-12-12
Payer: MEDICARE

## 2024-12-12 DIAGNOSIS — E87.5 HYPERKALEMIA: ICD-10-CM

## 2024-12-12 DIAGNOSIS — R79.89 ABNORMAL LFTS: Primary | ICD-10-CM

## 2024-12-12 DIAGNOSIS — R93.89 ABNORMAL CHEST X-RAY: ICD-10-CM

## 2024-12-12 PROBLEM — N40.0 BENIGN PROSTATIC HYPERPLASIA: Status: ACTIVE | Noted: 2024-12-12

## 2024-12-12 PROBLEM — R63.4 UNEXPLAINED WEIGHT LOSS: Status: ACTIVE | Noted: 2024-12-12

## 2024-12-12 LAB
HBV SURFACE AB SER-ACNC: <3.1 MIU/ML
HCV AB SER QL: NONREACTIVE

## 2024-12-12 NOTE — ASSESSMENT & PLAN NOTE
Will check CMP, CBC TSH, PSA, UA and CXR.  Colonoscopy on 5/7/24 - 3 adenomas.  Total weight loss according to office notes is 3 pounds in 12 mo

## 2024-12-12 NOTE — TELEPHONE ENCOUNTER
I spoke with Raúl.  Potassium is high.  He will push fluids and repeat potassium level tomorrow.  LFT's out of range and Hep B, Hep C screening, BILL, ASM will be included.  Iron studies earlier this year not suggestive of hemochromatosis.     His CXR shows new infiltrates.  He is completely asymptomatic - no cough, not out of breath, no fever, no chest pain. Will schedule CT to clarify findings on CXR.  To call central scheduling to arrange.

## 2024-12-13 ENCOUNTER — HOSPITAL ENCOUNTER (OUTPATIENT)
Dept: RADIOLOGY | Facility: CLINIC | Age: 79
Discharge: HOME | End: 2024-12-13
Payer: MEDICARE

## 2024-12-13 ENCOUNTER — LAB (OUTPATIENT)
Dept: LAB | Facility: LAB | Age: 79
End: 2024-12-13
Payer: MEDICARE

## 2024-12-13 DIAGNOSIS — R93.89 ABNORMAL CHEST X-RAY: ICD-10-CM

## 2024-12-13 DIAGNOSIS — N18.32 STAGE 3B CHRONIC KIDNEY DISEASE (MULTI): ICD-10-CM

## 2024-12-13 DIAGNOSIS — E87.5 HYPERKALEMIA: ICD-10-CM

## 2024-12-13 DIAGNOSIS — D64.9 ANEMIA, UNSPECIFIED TYPE: ICD-10-CM

## 2024-12-13 LAB
ANA PATTERN: ABNORMAL
ANA SER QL HEP2 SUBST: POSITIVE
ANA TITR SER IF: ABNORMAL {TITER}
ANION GAP SERPL CALC-SCNC: 14 MMOL/L (ref 10–20)
BASOPHILS # BLD AUTO: 0.05 X10*3/UL (ref 0–0.1)
BASOPHILS NFR BLD AUTO: 0.8 %
BUN SERPL-MCNC: 33 MG/DL (ref 6–23)
CALCIUM SERPL-MCNC: 9.5 MG/DL (ref 8.6–10.6)
CENTROMERE B AB SER-ACNC: <0.2 AI
CHLORIDE SERPL-SCNC: 107 MMOL/L (ref 98–107)
CHROMATIN AB SERPL-ACNC: <0.2 AI
CO2 SERPL-SCNC: 25 MMOL/L (ref 21–32)
CREAT SERPL-MCNC: 1.96 MG/DL (ref 0.5–1.3)
DSDNA AB SER-ACNC: <1 IU/ML
EGFRCR SERPLBLD CKD-EPI 2021: 34 ML/MIN/1.73M*2
ENA JO1 AB SER QL IA: <0.2 AI
ENA RNP AB SER IA-ACNC: <0.2 AI
ENA SCL70 AB SER QL IA: <0.2 AI
ENA SM AB SER IA-ACNC: <0.2 AI
ENA SM+RNP AB SER QL IA: <0.2 AI
ENA SS-A AB SER IA-ACNC: <0.2 AI
ENA SS-B AB SER IA-ACNC: <0.2 AI
EOSINOPHIL # BLD AUTO: 0.22 X10*3/UL (ref 0–0.4)
EOSINOPHIL NFR BLD AUTO: 3.4 %
ERYTHROCYTE [DISTWIDTH] IN BLOOD BY AUTOMATED COUNT: 13.8 % (ref 11.5–14.5)
GLUCOSE SERPL-MCNC: 94 MG/DL (ref 74–99)
HCT VFR BLD AUTO: 37.3 % (ref 41–52)
HGB BLD-MCNC: 12.4 G/DL (ref 13.5–17.5)
HGB RETIC QN: 37 PG (ref 28–38)
IMM GRANULOCYTES # BLD AUTO: 0.01 X10*3/UL (ref 0–0.5)
IMM GRANULOCYTES NFR BLD AUTO: 0.2 % (ref 0–0.9)
IMMATURE RETIC FRACTION: 4.6 %
LYMPHOCYTES # BLD AUTO: 2.65 X10*3/UL (ref 0.8–3)
LYMPHOCYTES NFR BLD AUTO: 40.8 %
MCH RBC QN AUTO: 32.4 PG (ref 26–34)
MCHC RBC AUTO-ENTMCNC: 33.2 G/DL (ref 32–36)
MCV RBC AUTO: 97 FL (ref 80–100)
MONOCYTES # BLD AUTO: 0.58 X10*3/UL (ref 0.05–0.8)
MONOCYTES NFR BLD AUTO: 8.9 %
NEUTROPHILS # BLD AUTO: 2.98 X10*3/UL (ref 1.6–5.5)
NEUTROPHILS NFR BLD AUTO: 45.9 %
NRBC BLD-RTO: 0 /100 WBCS (ref 0–0)
PLATELET # BLD AUTO: 237 X10*3/UL (ref 150–450)
POTASSIUM SERPL-SCNC: 4.8 MMOL/L (ref 3.5–5.3)
RBC # BLD AUTO: 3.83 X10*6/UL (ref 4.5–5.9)
RETICS #: 0.05 X10*6/UL (ref 0.02–0.11)
RETICS/RBC NFR AUTO: 1.3 % (ref 0.5–2)
RIBOSOMAL P AB SER-ACNC: <0.2 AI
SODIUM SERPL-SCNC: 141 MMOL/L (ref 136–145)
WBC # BLD AUTO: 6.5 X10*3/UL (ref 4.4–11.3)

## 2024-12-13 PROCEDURE — 80048 BASIC METABOLIC PNL TOTAL CA: CPT

## 2024-12-13 PROCEDURE — 71250 CT THORAX DX C-: CPT

## 2024-12-13 PROCEDURE — 36415 COLL VENOUS BLD VENIPUNCTURE: CPT

## 2024-12-13 PROCEDURE — 85025 COMPLETE CBC W/AUTO DIFF WBC: CPT

## 2024-12-13 PROCEDURE — 85045 AUTOMATED RETICULOCYTE COUNT: CPT

## 2024-12-15 ENCOUNTER — PATIENT MESSAGE (OUTPATIENT)
Dept: PRIMARY CARE | Facility: CLINIC | Age: 79
End: 2024-12-15
Payer: MEDICARE

## 2024-12-15 DIAGNOSIS — M25.561 ACUTE PAIN OF BOTH KNEES: Primary | ICD-10-CM

## 2024-12-15 DIAGNOSIS — R91.8 LUNG NODULES: Primary | ICD-10-CM

## 2024-12-15 DIAGNOSIS — R79.89 ABNORMAL LFTS: ICD-10-CM

## 2024-12-15 DIAGNOSIS — M25.562 ACUTE PAIN OF BOTH KNEES: Primary | ICD-10-CM

## 2024-12-16 LAB — SMOOTH MUSCLE AB SER QL IF: ABNORMAL

## 2024-12-18 RX ORDER — DICLOFENAC SODIUM 10 MG/G
4 GEL TOPICAL 3 TIMES DAILY PRN
Qty: 350 G | Refills: 0 | Status: SHIPPED | OUTPATIENT
Start: 2024-12-18

## 2024-12-30 ENCOUNTER — APPOINTMENT (OUTPATIENT)
Dept: ORTHOPEDIC SURGERY | Facility: CLINIC | Age: 79
End: 2024-12-30
Payer: MEDICARE

## 2024-12-30 DIAGNOSIS — M25.561 CHRONIC PAIN OF RIGHT KNEE: Primary | ICD-10-CM

## 2024-12-30 DIAGNOSIS — G89.29 CHRONIC PAIN OF RIGHT KNEE: Primary | ICD-10-CM

## 2025-01-03 ENCOUNTER — OFFICE VISIT (OUTPATIENT)
Dept: ORTHOPEDIC SURGERY | Facility: CLINIC | Age: 80
End: 2025-01-03
Payer: MEDICARE

## 2025-01-03 ENCOUNTER — HOSPITAL ENCOUNTER (OUTPATIENT)
Dept: RADIOLOGY | Facility: CLINIC | Age: 80
Discharge: HOME | End: 2025-01-03
Payer: MEDICARE

## 2025-01-03 VITALS — WEIGHT: 129 LBS | BODY MASS INDEX: 20.25 KG/M2 | HEIGHT: 67 IN

## 2025-01-03 DIAGNOSIS — M25.561 CHRONIC PAIN OF RIGHT KNEE: ICD-10-CM

## 2025-01-03 DIAGNOSIS — G89.29 CHRONIC PAIN OF RIGHT KNEE: ICD-10-CM

## 2025-01-03 DIAGNOSIS — M25.561 ACUTE PAIN OF RIGHT KNEE: ICD-10-CM

## 2025-01-03 PROCEDURE — 1125F AMNT PAIN NOTED PAIN PRSNT: CPT | Performed by: STUDENT IN AN ORGANIZED HEALTH CARE EDUCATION/TRAINING PROGRAM

## 2025-01-03 PROCEDURE — 99204 OFFICE O/P NEW MOD 45 MIN: CPT | Performed by: STUDENT IN AN ORGANIZED HEALTH CARE EDUCATION/TRAINING PROGRAM

## 2025-01-03 PROCEDURE — 1159F MED LIST DOCD IN RCRD: CPT | Performed by: STUDENT IN AN ORGANIZED HEALTH CARE EDUCATION/TRAINING PROGRAM

## 2025-01-03 PROCEDURE — 73564 X-RAY EXAM KNEE 4 OR MORE: CPT | Mod: RT

## 2025-01-03 PROCEDURE — 99214 OFFICE O/P EST MOD 30 MIN: CPT | Performed by: STUDENT IN AN ORGANIZED HEALTH CARE EDUCATION/TRAINING PROGRAM

## 2025-01-03 PROCEDURE — G2211 COMPLEX E/M VISIT ADD ON: HCPCS | Performed by: STUDENT IN AN ORGANIZED HEALTH CARE EDUCATION/TRAINING PROGRAM

## 2025-01-03 ASSESSMENT — PAIN DESCRIPTION - DESCRIPTORS: DESCRIPTORS: ACHING

## 2025-01-03 ASSESSMENT — PAIN - FUNCTIONAL ASSESSMENT: PAIN_FUNCTIONAL_ASSESSMENT: 0-10

## 2025-01-03 ASSESSMENT — PAIN SCALES - GENERAL: PAINLEVEL_OUTOF10: 8
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
## 2025-01-13 DIAGNOSIS — I25.10 CORONARY ARTERY DISEASE INVOLVING NATIVE CORONARY ARTERY OF NATIVE HEART WITHOUT ANGINA PECTORIS: ICD-10-CM

## 2025-01-13 RX ORDER — ATORVASTATIN CALCIUM 80 MG/1
80 TABLET, FILM COATED ORAL NIGHTLY
Qty: 90 TABLET | Refills: 3 | Status: SHIPPED | OUTPATIENT
Start: 2025-01-13 | End: 2026-01-13

## 2025-02-12 ENCOUNTER — OFFICE VISIT (OUTPATIENT)
Dept: PULMONOLOGY | Facility: CLINIC | Age: 80
End: 2025-02-12
Payer: MEDICARE

## 2025-02-12 ENCOUNTER — LAB (OUTPATIENT)
Dept: LAB | Facility: HOSPITAL | Age: 80
End: 2025-02-12
Payer: MEDICARE

## 2025-02-12 VITALS
RESPIRATION RATE: 16 BRPM | SYSTOLIC BLOOD PRESSURE: 142 MMHG | BODY MASS INDEX: 20.8 KG/M2 | OXYGEN SATURATION: 98 % | WEIGHT: 132.8 LBS | HEART RATE: 65 BPM | DIASTOLIC BLOOD PRESSURE: 81 MMHG | TEMPERATURE: 96.6 F

## 2025-02-12 DIAGNOSIS — J84.9 INTERSTITIAL LUNG DISEASE (MULTI): Primary | ICD-10-CM

## 2025-02-12 DIAGNOSIS — R91.8 LUNG NODULES: ICD-10-CM

## 2025-02-12 LAB — PROT SERPL-MCNC: 6.8 G/DL (ref 6.4–8.2)

## 2025-02-12 PROCEDURE — 3079F DIAST BP 80-89 MM HG: CPT | Performed by: INTERNAL MEDICINE

## 2025-02-12 PROCEDURE — 1036F TOBACCO NON-USER: CPT | Performed by: INTERNAL MEDICINE

## 2025-02-12 PROCEDURE — 99215 OFFICE O/P EST HI 40 MIN: CPT | Performed by: INTERNAL MEDICINE

## 2025-02-12 PROCEDURE — 1160F RVW MEDS BY RX/DR IN RCRD: CPT | Performed by: INTERNAL MEDICINE

## 2025-02-12 PROCEDURE — 84155 ASSAY OF PROTEIN SERUM: CPT

## 2025-02-12 PROCEDURE — 99205 OFFICE O/P NEW HI 60 MIN: CPT | Performed by: INTERNAL MEDICINE

## 2025-02-12 PROCEDURE — 1159F MED LIST DOCD IN RCRD: CPT | Performed by: INTERNAL MEDICINE

## 2025-02-12 PROCEDURE — 3077F SYST BP >= 140 MM HG: CPT | Performed by: INTERNAL MEDICINE

## 2025-02-12 RX ORDER — ALBUTEROL SULFATE 90 UG/1
1 INHALANT RESPIRATORY (INHALATION) ONCE
OUTPATIENT
Start: 2025-02-12

## 2025-02-12 RX ORDER — ALBUTEROL SULFATE 0.83 MG/ML
3 SOLUTION RESPIRATORY (INHALATION) ONCE
OUTPATIENT
Start: 2025-02-12 | End: 2025-02-12

## 2025-02-12 ASSESSMENT — ENCOUNTER SYMPTOMS
HEMATURIA: 0
DIFFICULTY URINATING: 0
NUMBNESS: 0
EYE REDNESS: 0
DYSURIA: 0
ADENOPATHY: 0
RHINORRHEA: 0
HEADACHES: 0
CHOKING: 0
SPEECH DIFFICULTY: 0
SHORTNESS OF BREATH: 0
ABDOMINAL PAIN: 0
ARTHRALGIAS: 0
TREMORS: 0
SINUS PAIN: 0
EYE DISCHARGE: 0
CONSTIPATION: 0
SINUS PRESSURE: 0
WHEEZING: 0
DIZZINESS: 0
FATIGUE: 0
ABDOMINAL DISTENTION: 0
JOINT SWELLING: 0
COUGH: 0
AGITATION: 0
BRUISES/BLEEDS EASILY: 0
FACIAL SWELLING: 0
NAUSEA: 0
SLEEP DISTURBANCE: 0
STRIDOR: 0
LIGHT-HEADEDNESS: 0
NERVOUS/ANXIOUS: 0
WEAKNESS: 0
FREQUENCY: 0
APNEA: 0
UNEXPECTED WEIGHT CHANGE: 1
FEVER: 0
PALPITATIONS: 0

## 2025-02-12 NOTE — PROGRESS NOTES
@PULMONARY CONSULTATION@         Reason for Consult: ILD     ASSESSMENT:   The patient is a 79-year-old with a history of hypertension, chronic kidney disease status post nephrectomy as a child.  He has got mitral insufficiency, hyperlipidemia and gout.  He also has a history of seizures and is on Trileptal for that.  He has unexplained weight loss of 10 pounds over the last year which may be related to his cessation of alcohol intake.  His review of systems is pretty much negative except for recent carpal tunnel surgery bilaterally.  He has been on oxcarbazepine for seizures which is a newer form of   carbazepine which in rare cases can cause interstitial pneumonitis.  Also it should be noted the patient has a nonspecific 7 mm left apical nodule which should be followed.  He smoked for 10 years in the remote past stopping around 1970    PLAN:   Will obtain complete pulmonary function test and a 6-minute walk.    Will obtain interstitial lung disease labs surrounding NSIP.    Follow-up CT scan in 3 months because of the nodularity as well as the ILD.      HISTORY OF PRESENT ILLNESS:    The patient is a 79-year-old with a history of hypertension and some chronic status post kidney disease stage III, status post nephrectomy as a child.  He also has a history of mitral valve disease and premature ventricular contraction.  He has coronary atherosclerosis without a prior myocardial infarction.  His calcification score in the past history of 64 in 2014.  He also has hyperlipidemia gout and a history of seizures.  Is also had PVCs.        A chest x-ray from December 12, 2024 revealed the following  New areas of subtle increased airspace opacity both in the right mid  lung and at the right lung base which could suggest a component of  multifocal pneumonia.      Remote granulomatous disease again noted unchanged      A CT scan of the chest from December 13, 2024 revealed the following  Interstitial lung disease with a  pattern most suggestive of  nonspecific interstitial pneumonia (NSIP).  No suspicious mass, lymphadenopathy or focal consolidation.  Nonspecific 7 mm left apical nodule.    The echocardiogram from August 23, 2023 revealed the following  - The left ventricle is normal in size. There is mild concentric left ventricular   hypertrophy. Left ventricular systolic function is normal. EF = 65 ± 5% (2D   biplane)   - The right ventricle is normal in size. Right ventricular systolic function is   normal.   -Mild to moderate MR    Currently, the patient reports that he has no coughing congestion wheezing shortness of breath PND orthopnea.  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  He was exposed to horses and other animals over the years.  He reports no exercise limitations and he works out 4 times per week.  He has no swelling of his legs without Raynaud's or Sjogren's or any known inflammatory arthritis.  He has had no fevers or chills.  He has an unexplained weight loss of around 10 pounds over the last year.  He did stop drinking at that time in relationship to his chronic kidney disease.  He has not had any recent travel.  He does have his mitral valve disease and has been followed by cardiology as well as chronic kidney disease followed by nephrology.            No Known Allergies     PAST MEDICAL HISTORY:   history of hypertension and some chronic status post kidney disease stage III, status post nephrectomy as a child.  He also has a history of mitral valve disease and premature ventricular contraction.  He has coronary atherosclerosis without a prior myocardial infarction.  His calcification score in the past history of 64 in 2014.  He also has hyperlipidemia gout and a history of seizures.  Is also had PVCs.        Current Outpatient Medications:     acetaminophen (Tylenol Extra Strength) 500 mg tablet, Take 1 tablet (500 mg) by mouth if needed. Every 4 to 6 hours, Disp: , Rfl:     allopurinol  (Zyloprim) 100 mg tablet, Take 2 tablets (200 mg) by mouth 2 times a day., Disp: 120 tablet, Rfl: 1    atorvastatin (Lipitor) 80 mg tablet, Take 1 tablet (80 mg) by mouth once daily at bedtime., Disp: 90 tablet, Rfl: 3    ezetimibe (Zetia) 10 mg tablet, Take 1 tablet (10 mg) by mouth once every day., Disp: 90 tablet, Rfl: 3    lisinopril 10 mg tablet, Take 1 tablet (10 mg) by mouth once daily., Disp: , Rfl:     multivitamin-min-iron-FA-vit K (Adults Multivitamin) 18 mg iron-400 mcg-25 mcg tablet, Take 1 tablet by mouth once daily., Disp: , Rfl:     OXcarbazepine (Trileptal) 300 mg tablet, Take by mouth. Take 1.5 tablets every morning and 1.5 tablets at bedtime, Disp: , Rfl:      FAMILY HISTORY:   There is no family history of lung problems, COPD emphysema asthma etc.  SOCIAL HISTORY:  He smoked for around 10 years up until 1970.  He has stopped drinking over the last year.  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    Review of Systems   Constitutional:  Positive for unexpected weight change. Negative for fatigue and fever.   HENT:  Negative for congestion, facial swelling, nosebleeds, postnasal drip, rhinorrhea, sinus pressure and sinus pain.    Eyes:  Negative for discharge, redness and visual disturbance.   Respiratory:  Negative for apnea, cough, choking, shortness of breath, wheezing and stridor.    Cardiovascular:  Negative for chest pain, palpitations and leg swelling.   Gastrointestinal:  Negative for abdominal distention, abdominal pain, constipation and nausea.   Endocrine: Negative for cold intolerance and heat intolerance.   Genitourinary:  Negative for difficulty urinating, dysuria, frequency and hematuria.   Musculoskeletal:  Negative for arthralgias, gait problem and joint swelling.   Allergic/Immunologic: Negative for environmental allergies, food allergies and immunocompromised state.   Neurological:  Negative for  dizziness, tremors, syncope, speech difficulty, weakness, light-headedness, numbness and headaches.   Hematological:  Negative for adenopathy. Does not bruise/bleed easily.   Psychiatric/Behavioral:  Negative for agitation, behavioral problems and sleep disturbance. The patient is not nervous/anxious.         Vitals:    02/12/25 1005   BP: 142/81   Pulse: 65   Resp: 16   Temp: 35.9 °C (96.6 °F)   SpO2: 98%        Physical Exam  Vitals reviewed.   Constitutional:       Appearance: Normal appearance.   HENT:      Head: Normocephalic and atraumatic.   Eyes:      Extraocular Movements: Extraocular movements intact.   Cardiovascular:      Rate and Rhythm: Normal rate and regular rhythm.      Heart sounds: No murmur heard.     No friction rub. No gallop.   Pulmonary:      Effort: Pulmonary effort is normal. No respiratory distress.      Breath sounds: Normal breath sounds. No stridor. No wheezing, rhonchi or rales.   Chest:      Chest wall: No tenderness.   Abdominal:      General: Abdomen is flat. There is no distension.      Palpations: Abdomen is soft. There is no mass.      Tenderness: There is no abdominal tenderness.   Musculoskeletal:         General: Normal range of motion.      Cervical back: Normal range of motion.      Right lower leg: No edema.      Left lower leg: No edema.   Skin:     General: Skin is warm and dry.   Neurological:      Mental Status: He is alert and oriented to person, place, and time.   Psychiatric:         Mood and Affect: Mood normal.         Behavior: Behavior normal.

## 2025-02-12 NOTE — PATIENT INSTRUCTIONS
I am going to order complete pulmonary function test along with a walk test which can be scheduled by calling     In addition, I am going to order a whole battery of blood test which can cause interstitial scarring within your lungs.    After the above results are available I will discuss them with you

## 2025-02-14 LAB
ALBUMIN: 4.3 G/DL (ref 3.4–5)
ALPHA 1 GLOBULIN: 0.3 G/DL (ref 0.2–0.6)
ALPHA 2 GLOBULIN: 0.8 G/DL (ref 0.4–1.1)
BETA GLOBULIN: 0.7 G/DL (ref 0.5–1.2)
GAMMA GLOBULIN: 0.8 G/DL (ref 0.5–1.4)
IMMUNOFIXATION COMMENT: NORMAL
PATH REVIEW - SERUM IMMUNOFIXATION: NORMAL
PATH REVIEW-SERUM PROTEIN ELECTROPHORESIS: NORMAL
PROTEIN ELECTROPHORESIS COMMENT: NORMAL

## 2025-02-16 LAB
A FUMIGATUS AB SER QL ID: NORMAL
ACE SERPL-CCNC: 5 U/L (ref 9–67)
ANA SER QL IF: NEGATIVE
CCP IGG SERPL-ACNC: <16 UNITS
CK SERPL-CCNC: 145 U/L (ref 19–278)
CN-1A IGG SERPL QL IA: <5 UNITS
EJ AB SER QL: NORMAL
ENA JO1 AB SER QL IB: NORMAL
ERYTHROCYTE [SEDIMENTATION RATE] IN BLOOD BY WESTERGREN METHOD: 2 MM/H
HIV 1+2 AB+HIV1 P24 AG SERPL QL IA: NORMAL
HMGCR IGG SER IA-ACNC: <2 CU
IGA SERPL-MCNC: 146 MG/DL (ref 70–320)
IGG SERPL-MCNC: 727 MG/DL (ref 600–1540)
IGG4 SER-MCNC: 32.9 MG/DL (ref 4–86)
IGM SERPL-MCNC: 229 MG/DL (ref 50–300)
MDA5 AB SER LINE BLOT-ACNC: NORMAL
MI-2 ALPHA AB SER LINE BLOT-ACNC: NORMAL
MI-2 BETA AB SER LINE BLOT-ACNC: NORMAL
MJ AB SER LINE BLOT-ACNC: NORMAL
OJ AB SER QL IB: NORMAL
PIGEON SERUM AB QL ID: NORMAL
PL12 AB SER QL IB: NORMAL
PL7 AB SER QL IB: NORMAL
RHEUMATOID FACT SERPL-ACNC: 18 IU/ML
S RECTIVIRGULA AB SER QL ID: NORMAL
S VIRIDIS AB SER QL ID: NORMAL
SRP AB SERPL QL IB: NORMAL
T CANDIDUS AB SER QL: NORMAL
T VULGARIS AB SER QL ID: NORMAL
TIF1-GAMMA AB SER LINE BLOT-ACNC: NORMAL

## 2025-02-21 LAB
A FUMIGATUS AB SER QL ID: NEGATIVE
ACE SERPL-CCNC: 5 U/L (ref 9–67)
ANA SER QL IF: NEGATIVE
CCP IGG SERPL-ACNC: <16 UNITS
CK SERPL-CCNC: 145 U/L (ref 19–278)
CN-1A IGG SERPL QL IA: <5 UNITS
EJ AB SER QL: <11 SI
ENA JO1 AB SER QL IB: <11 SI
ERYTHROCYTE [SEDIMENTATION RATE] IN BLOOD BY WESTERGREN METHOD: 2 MM/H
HMGCR IGG SER IA-ACNC: <2 CU
MDA5 AB SER LINE BLOT-ACNC: <11 SI
MI-2 ALPHA AB SER LINE BLOT-ACNC: <11 SI
MI-2 BETA AB SER LINE BLOT-ACNC: <11 SI
MJ AB SER LINE BLOT-ACNC: <11 SI
OJ AB SER QL IB: <11 SI
PIGEON SERUM AB QL ID: NEGATIVE
PL12 AB SER QL IB: <11 SI
PL7 AB SER QL IB: <11 SI
RHEUMATOID FACT SERPL-ACNC: 18 IU/ML
S RECTIVIRGULA AB SER QL ID: NEGATIVE
S VIRIDIS AB SER QL ID: NEGATIVE
SRP AB SERPL QL IB: <11 SI
T CANDIDUS AB SER QL: NEGATIVE
T VULGARIS AB SER QL ID: NEGATIVE
TIF1-GAMMA AB SER LINE BLOT-ACNC: <11 SI

## 2025-02-27 DIAGNOSIS — M10.9 GOUT, UNSPECIFIED CAUSE, UNSPECIFIED CHRONICITY, UNSPECIFIED SITE: ICD-10-CM

## 2025-02-27 RX ORDER — ALLOPURINOL 100 MG/1
200 TABLET ORAL 2 TIMES DAILY
Qty: 120 TABLET | Refills: 1 | Status: SHIPPED | OUTPATIENT
Start: 2025-02-27

## 2025-03-11 ENCOUNTER — APPOINTMENT (OUTPATIENT)
Dept: PRIMARY CARE | Facility: CLINIC | Age: 80
End: 2025-03-11
Payer: MEDICARE

## 2025-03-11 VITALS
SYSTOLIC BLOOD PRESSURE: 129 MMHG | BODY MASS INDEX: 21 KG/M2 | OXYGEN SATURATION: 96 % | HEART RATE: 85 BPM | DIASTOLIC BLOOD PRESSURE: 79 MMHG | WEIGHT: 133.8 LBS | HEIGHT: 67 IN | TEMPERATURE: 97.5 F

## 2025-03-11 DIAGNOSIS — E78.5 HYPERLIPIDEMIA, UNSPECIFIED HYPERLIPIDEMIA TYPE: ICD-10-CM

## 2025-03-11 DIAGNOSIS — Z76.89 ENCOUNTER TO ESTABLISH CARE WITH NEW DOCTOR: ICD-10-CM

## 2025-03-11 DIAGNOSIS — M10.9 GOUT, UNSPECIFIED CAUSE, UNSPECIFIED CHRONICITY, UNSPECIFIED SITE: ICD-10-CM

## 2025-03-11 DIAGNOSIS — G40.019: Primary | ICD-10-CM

## 2025-03-11 DIAGNOSIS — I10 PRIMARY HYPERTENSION: ICD-10-CM

## 2025-03-11 PROBLEM — I34.0 MITRAL REGURGITATION: Status: ACTIVE | Noted: 2023-01-19

## 2025-03-11 PROCEDURE — 1160F RVW MEDS BY RX/DR IN RCRD: CPT | Performed by: STUDENT IN AN ORGANIZED HEALTH CARE EDUCATION/TRAINING PROGRAM

## 2025-03-11 PROCEDURE — 3078F DIAST BP <80 MM HG: CPT | Performed by: STUDENT IN AN ORGANIZED HEALTH CARE EDUCATION/TRAINING PROGRAM

## 2025-03-11 PROCEDURE — G2211 COMPLEX E/M VISIT ADD ON: HCPCS | Performed by: STUDENT IN AN ORGANIZED HEALTH CARE EDUCATION/TRAINING PROGRAM

## 2025-03-11 PROCEDURE — 1159F MED LIST DOCD IN RCRD: CPT | Performed by: STUDENT IN AN ORGANIZED HEALTH CARE EDUCATION/TRAINING PROGRAM

## 2025-03-11 PROCEDURE — 99214 OFFICE O/P EST MOD 30 MIN: CPT | Performed by: STUDENT IN AN ORGANIZED HEALTH CARE EDUCATION/TRAINING PROGRAM

## 2025-03-11 PROCEDURE — 3074F SYST BP LT 130 MM HG: CPT | Performed by: STUDENT IN AN ORGANIZED HEALTH CARE EDUCATION/TRAINING PROGRAM

## 2025-03-11 PROCEDURE — 1036F TOBACCO NON-USER: CPT | Performed by: STUDENT IN AN ORGANIZED HEALTH CARE EDUCATION/TRAINING PROGRAM

## 2025-03-11 RX ORDER — ALLOPURINOL 100 MG/1
200 TABLET ORAL DAILY
Qty: 120 TABLET | Refills: 1 | Status: SHIPPED | OUTPATIENT
Start: 2025-03-11

## 2025-03-11 ASSESSMENT — PATIENT HEALTH QUESTIONNAIRE - PHQ9
1. LITTLE INTEREST OR PLEASURE IN DOING THINGS: NOT AT ALL
SUM OF ALL RESPONSES TO PHQ9 QUESTIONS 1 AND 2: 0
2. FEELING DOWN, DEPRESSED OR HOPELESS: NOT AT ALL

## 2025-03-11 ASSESSMENT — ENCOUNTER SYMPTOMS
SHORTNESS OF BREATH: 0
CHILLS: 0
NAUSEA: 0
LOSS OF SENSATION IN FEET: 0
FATIGUE: 0
CONSTIPATION: 0
VOMITING: 0
ABDOMINAL PAIN: 0
COUGH: 0
ADENOPATHY: 0
DIZZINESS: 0
DIARRHEA: 0
HEADACHES: 0
COLOR CHANGE: 0
FEVER: 0
WHEEZING: 0
DEPRESSION: 0
OCCASIONAL FEELINGS OF UNSTEADINESS: 0
DIFFICULTY URINATING: 0
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
## 2025-03-11 NOTE — PATIENT INSTRUCTIONS
"Please schedule your medicare annual wellness visit in the next few months, the medicare annual wellness is your \"yearly physical\".  "

## 2025-03-17 ENCOUNTER — APPOINTMENT (OUTPATIENT)
Dept: RESPIRATORY THERAPY | Facility: HOSPITAL | Age: 80
End: 2025-03-17
Payer: MEDICARE

## 2025-03-17 ENCOUNTER — HOSPITAL ENCOUNTER (OUTPATIENT)
Dept: RESPIRATORY THERAPY | Facility: HOSPITAL | Age: 80
Discharge: HOME | End: 2025-03-17
Payer: MEDICARE

## 2025-03-17 DIAGNOSIS — J84.9 INTERSTITIAL LUNG DISEASE (MULTI): ICD-10-CM

## 2025-03-17 DIAGNOSIS — R91.8 LUNG NODULES: ICD-10-CM

## 2025-03-17 PROCEDURE — 94726 PLETHYSMOGRAPHY LUNG VOLUMES: CPT | Performed by: INTERNAL MEDICINE

## 2025-03-17 PROCEDURE — 94060 EVALUATION OF WHEEZING: CPT | Performed by: INTERNAL MEDICINE

## 2025-03-17 PROCEDURE — 94726 PLETHYSMOGRAPHY LUNG VOLUMES: CPT

## 2025-03-17 PROCEDURE — 94729 DIFFUSING CAPACITY: CPT | Performed by: INTERNAL MEDICINE

## 2025-03-17 PROCEDURE — 94618 PULMONARY STRESS TESTING: CPT | Performed by: INTERNAL MEDICINE

## 2025-03-17 RX ORDER — ALBUTEROL SULFATE 0.83 MG/ML
3 SOLUTION RESPIRATORY (INHALATION) ONCE
OUTPATIENT
Start: 2025-03-17 | End: 2025-03-17

## 2025-03-17 RX ORDER — ALBUTEROL SULFATE 90 UG/1
4 INHALANT RESPIRATORY (INHALATION) ONCE
OUTPATIENT
Start: 2025-03-17 | End: 2025-03-17

## 2025-03-18 ENCOUNTER — DOCUMENTATION (OUTPATIENT)
Dept: PULMONOLOGY | Facility: HOSPITAL | Age: 80
End: 2025-03-18
Payer: MEDICARE

## 2025-03-18 NOTE — PROGRESS NOTES
The 6-minute walk outlines a walk distance of 480 m with a saturation going from 99% to 94%.  He really had no significant symptoms.  The walk distance was normal.    The PFTs outlined an FVC of 3.73 L 107% predicted with an FEV1 of 2.66 L 103% predicted and FEV1 percent of 72%.  The TLC was 93% predicted.  The DLCO was 15.468% predicted.  This was above the lower limits of normal.    His interstitial lung disease labs are unremarkable.  The rheumatoid factor was 18 and the Citrulline antibody was normal.  Will obtain a follow-up CT scan in 3 months.

## 2025-03-19 ENCOUNTER — OFFICE VISIT (OUTPATIENT)
Dept: CARDIOLOGY | Facility: HOSPITAL | Age: 80
End: 2025-03-19
Payer: MEDICARE

## 2025-03-19 VITALS
HEART RATE: 62 BPM | HEIGHT: 67 IN | DIASTOLIC BLOOD PRESSURE: 67 MMHG | BODY MASS INDEX: 20.62 KG/M2 | WEIGHT: 131.4 LBS | OXYGEN SATURATION: 97 % | SYSTOLIC BLOOD PRESSURE: 131 MMHG

## 2025-03-19 DIAGNOSIS — I25.10 CORONARY ARTERY DISEASE INVOLVING NATIVE CORONARY ARTERY OF NATIVE HEART WITHOUT ANGINA PECTORIS: Primary | ICD-10-CM

## 2025-03-19 DIAGNOSIS — I10 PRIMARY HYPERTENSION: ICD-10-CM

## 2025-03-19 DIAGNOSIS — E78.00 HYPERCHOLESTEROLEMIA: ICD-10-CM

## 2025-03-19 DIAGNOSIS — N18.32 STAGE 3B CHRONIC KIDNEY DISEASE (MULTI): ICD-10-CM

## 2025-03-19 PROCEDURE — G2211 COMPLEX E/M VISIT ADD ON: HCPCS | Performed by: INTERNAL MEDICINE

## 2025-03-19 PROCEDURE — 3075F SYST BP GE 130 - 139MM HG: CPT | Performed by: INTERNAL MEDICINE

## 2025-03-19 PROCEDURE — 1159F MED LIST DOCD IN RCRD: CPT | Performed by: INTERNAL MEDICINE

## 2025-03-19 PROCEDURE — 1160F RVW MEDS BY RX/DR IN RCRD: CPT | Performed by: INTERNAL MEDICINE

## 2025-03-19 PROCEDURE — 99214 OFFICE O/P EST MOD 30 MIN: CPT | Performed by: INTERNAL MEDICINE

## 2025-03-19 PROCEDURE — 1036F TOBACCO NON-USER: CPT | Performed by: INTERNAL MEDICINE

## 2025-03-19 PROCEDURE — 3078F DIAST BP <80 MM HG: CPT | Performed by: INTERNAL MEDICINE

## 2025-03-19 RX ORDER — EZETIMIBE 10 MG/1
10 TABLET ORAL
Qty: 90 TABLET | Refills: 3 | Status: SHIPPED | OUTPATIENT
Start: 2025-03-19 | End: 2026-03-19
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
## 2025-03-19 NOTE — PATIENT INSTRUCTIONS
Continue to exercise.  Check blood work with your primary physician as scheduled.  Follow-up in December.

## 2025-05-12 ENCOUNTER — HOSPITAL ENCOUNTER (OUTPATIENT)
Dept: RADIOLOGY | Facility: HOSPITAL | Age: 80
Discharge: HOME | End: 2025-05-12
Payer: MEDICARE

## 2025-05-12 DIAGNOSIS — J84.9 INTERSTITIAL LUNG DISEASE (MULTI): ICD-10-CM

## 2025-05-12 DIAGNOSIS — R91.8 LUNG NODULES: ICD-10-CM

## 2025-05-12 PROCEDURE — 71250 CT THORAX DX C-: CPT

## 2025-05-12 PROCEDURE — 71250 CT THORAX DX C-: CPT | Performed by: RADIOLOGY

## 2025-06-02 ENCOUNTER — APPOINTMENT (OUTPATIENT)
Dept: NEPHROLOGY | Facility: CLINIC | Age: 80
End: 2025-06-02
Payer: MEDICARE

## 2025-06-02 VITALS
WEIGHT: 132.6 LBS | DIASTOLIC BLOOD PRESSURE: 67 MMHG | SYSTOLIC BLOOD PRESSURE: 102 MMHG | TEMPERATURE: 97.8 F | HEART RATE: 54 BPM | HEIGHT: 67 IN | BODY MASS INDEX: 20.81 KG/M2

## 2025-06-02 DIAGNOSIS — Z90.5 HISTORY OF NEPHRECTOMY: ICD-10-CM

## 2025-06-02 DIAGNOSIS — E78.5 HYPERLIPIDEMIA, UNSPECIFIED HYPERLIPIDEMIA TYPE: ICD-10-CM

## 2025-06-02 DIAGNOSIS — E55.9 VITAMIN D DEFICIENCY: ICD-10-CM

## 2025-06-02 DIAGNOSIS — N18.32 STAGE 3B CHRONIC KIDNEY DISEASE (MULTI): Primary | ICD-10-CM

## 2025-06-02 DIAGNOSIS — N18.6 END STAGE RENAL DISEASE (MULTI): ICD-10-CM
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
## 2025-07-12 DIAGNOSIS — M10.9 GOUT, UNSPECIFIED CAUSE, UNSPECIFIED CHRONICITY, UNSPECIFIED SITE: ICD-10-CM

## 2025-07-16 RX ORDER — ALLOPURINOL 100 MG/1
TABLET ORAL
Qty: 180 TABLET | Refills: 3 | Status: SHIPPED | OUTPATIENT
Start: 2025-07-16

## 2025-08-05 ENCOUNTER — APPOINTMENT (OUTPATIENT)
Dept: DERMATOLOGY | Facility: CLINIC | Age: 80
End: 2025-08-05
Payer: MEDICARE

## 2025-08-05 DIAGNOSIS — L21.9 SEBORRHEIC DERMATITIS: ICD-10-CM

## 2025-08-05 DIAGNOSIS — D48.5 NEOPLASM OF UNCERTAIN BEHAVIOR OF SKIN: Primary | ICD-10-CM

## 2025-08-05 DIAGNOSIS — L82.1 SEBORRHEIC KERATOSIS: ICD-10-CM

## 2025-08-05 DIAGNOSIS — L57.8 DIFFUSE PHOTODAMAGE OF SKIN: ICD-10-CM

## 2025-08-05 DIAGNOSIS — D22.5 MELANOCYTIC NEVUS OF TRUNK: ICD-10-CM

## 2025-08-05 DIAGNOSIS — L57.0 ACTINIC KERATOSIS: ICD-10-CM

## 2025-08-05 DIAGNOSIS — D18.01 HEMANGIOMA OF SKIN: ICD-10-CM

## 2025-08-05 DIAGNOSIS — Z86.018 HISTORY OF DYSPLASTIC NEVUS: ICD-10-CM

## 2025-08-05 PROCEDURE — 1159F MED LIST DOCD IN RCRD: CPT | Performed by: DERMATOLOGY

## 2025-08-05 PROCEDURE — 11301 SHAVE SKIN LESION 0.6-1.0 CM: CPT | Performed by: DERMATOLOGY

## 2025-08-05 PROCEDURE — 17004 DESTROY PREMAL LESIONS 15/>: CPT | Performed by: DERMATOLOGY

## 2025-08-05 PROCEDURE — 99214 OFFICE O/P EST MOD 30 MIN: CPT | Performed by: DERMATOLOGY

## 2025-08-05 RX ORDER — KETOCONAZOLE 20 MG/G
CREAM TOPICAL
Qty: 60 G | Refills: 11 | Status: SHIPPED | OUTPATIENT
Start: 2025-08-05

## 2025-08-05 ASSESSMENT — DERMATOLOGY PATIENT ASSESSMENT
DO YOU USE A TANNING BED: NO
DO YOU HAVE ANY NEW OR CHANGING LESIONS: NO
ARE YOU AN ORGAN TRANSPLANT RECIPIENT: NO
HAVE YOU HAD OR DO YOU HAVE A STAPH INFECTION: NO
HAVE YOU HAD OR DO YOU HAVE VASCULAR DISEASE: NO

## 2025-08-05 ASSESSMENT — PATIENT GLOBAL ASSESSMENT (PGA): PATIENT GLOBAL ASSESSMENT: PATIENT GLOBAL ASSESSMENT:  1 - CLEAR

## 2025-08-05 ASSESSMENT — DERMATOLOGY QUALITY OF LIFE (QOL) ASSESSMENT
RATE HOW BOTHERED YOU ARE BY SYMPTOMS OF YOUR SKIN PROBLEM (EG, ITCHING, STINGING BURNING, HURTING OR SKIN IRRITATION): 0 - NEVER BOTHERED
ARE THERE EXCLUSIONS OR EXCEPTIONS FOR THE QUALITY OF LIFE ASSESSMENT: NO
RATE HOW EMOTIONALLY BOTHERED YOU ARE BY YOUR SKIN PROBLEM (FOR EXAMPLE, WORRY, EMBARRASSMENT, FRUSTRATION): 0 - NEVER BOTHERED
WHAT SINGLE SKIN CONDITION LISTED BELOW IS THE PATIENT ANSWERING THE QUALITY-OF-LIFE ASSESSMENT QUESTIONS ABOUT: NONE OF THE ABOVE
DATE THE QUALITY-OF-LIFE ASSESSMENT WAS COMPLETED: 67422
RATE HOW BOTHERED YOU ARE BY EFFECTS OF YOUR SKIN PROBLEMS ON YOUR ACTIVITIES (EG, GOING OUT, ACCOMPLISHING WHAT YOU WANT, WORK ACTIVITIES OR YOUR RELATIONSHIPS WITH OTHERS): 0 - NEVER BOTHERED

## 2025-08-05 ASSESSMENT — ITCH NUMERIC RATING SCALE: HOW SEVERE IS YOUR ITCHING?: 0

## 2025-08-05 NOTE — PROGRESS NOTES
Subjective     Michael Camacho is a 80 y.o. male who presents for the following: Skin Check.  He notes dry, flaky skin on his face sometimes, especially in the creases of the nose.  He denies any new, changing, or concerning skin lesions since his last visit; no bleeding, itching, or burning lesions.      Review of Systems:  No other skin or systemic complaints other than what is documented elsewhere in the note.    The following portions of the chart were reviewed this encounter and updated as appropriate:       Skin Cancer History  Biopsy Log Book  No skin cancers from Specimen Tracking.    Additional History      Specialty Problems          Dermatology Problems    Keratoacanthoma       Past Dermatologic / Past Relevant Medical History:    - history of AKs  - compound dysplastic nevus with mild atypia on left lateral lower back diagnosed at initial visit on 7/30/24  - no history of skin cancer    Family History:    No family history of melanoma or skin cancer    Social History:    The patient states he is retired from working as the  of [employer] and now 2 of his sons work for the [employer], and he lives in [facility] on [facility] states he and his wife are close friends with  and Mrs. Braga    Allergies:  Patient has no known allergies.    Current Medications / CAM's:  Current Medications[1]     Objective   Well appearing patient in no apparent distress; mood and affect are within normal limits.    A full examination was performed including scalp, face, eyes, ears, nose, lips, neck, chest, axillae, abdomen, back, bilateral upper extremities, and bilateral lower extremities. All findings within normal limits unless otherwise noted below.    Assessment/Plan   Skin Exam  1. NEOPLASM OF UNCERTAIN BEHAVIOR OF SKIN (2)  Right Lateral Distal Thigh  6 mm dark brown pigmented, asymmetric macule with an asymmetric pigment network and irregular borders     - Shave removal    Lesion length (cm):   0.6  Margin per side (cm):  0.2  Lesion diameter (cm):  1  Informed consent: discussed and consent obtained    Timeout: patient name, date of birth, surgical site, and procedure verified    Procedure prep:  Patient was prepped and draped  Anesthesia: the lesion was anesthetized in a standard fashion    Anesthetic:  1% lidocaine w/ epinephrine 1-100,000 local infiltration  Instrument used: flexible razor blade    Hemostasis achieved with: aluminum chloride    Outcome: patient tolerated procedure well    Post-procedure details: sterile dressing applied and wound care instructions given    Dressing type: bandage and petrolatum      - Staff Communication: Dermatology Local Anesthesia: 1 % Lidocaine / Epinephrine - Amount:0.5ml  Specimen 1 - Dermatopathology- DERM LAB  Differential Diagnosis: DN  Check Margins Yes/No?:    Comments:    Dermpath Lab: Routine Histopathology (formalin-fixed tissue)  Left Mid-Upper Back  5 mm dark brown pigmented, asymmetric macule with an asymmetric pigment network and irregular borders     - Shave removal    Lesion length (cm):  0.5  Margin per side (cm):  0.2  Lesion diameter (cm):  0.9  Informed consent: discussed and consent obtained    Timeout: patient name, date of birth, surgical site, and procedure verified    Procedure prep:  Patient was prepped and draped  Anesthesia: the lesion was anesthetized in a standard fashion    Anesthetic:  1% lidocaine w/ epinephrine 1-100,000 local infiltration  Instrument used: flexible razor blade    Hemostasis achieved with: aluminum chloride    Outcome: patient tolerated procedure well    Post-procedure details: sterile dressing applied and wound care instructions given    Dressing type: bandage and petrolatum      - Staff Communication: Dermatology Local Anesthesia: 1 % Lidocaine / Epinephrine - Amount:0.5ml  Specimen 2 - Dermatopathology- DERM LAB  Differential Diagnosis: DN  Check Margins Yes/No?:    Comments:    Dermpath Lab: Routine  Histopathology (formalin-fixed tissue)  2. ACTINIC KERATOSIS (16)  Head - Anterior (Face) (16)  Scattered on the patient's face, there are multiple erythematous, gritty, scaly macules   Actinic Keratoses - scattered on face.  The pre-cancerous nature of these lesions and treatment options were discussed with the patient today.  At this time, I recommend treatment with liquid nitrogen cryotherapy.  The patient expressed understanding, is in agreement with this plan, and wishes to proceed with cryotherapy today.  - Destr of lesion - Head - Anterior (Face) (16)  Complexity: simple    Destruction method: cryotherapy    Informed consent: discussed and consent obtained    Lesion destroyed using liquid nitrogen: Yes    Cryotherapy cycles:  1  Outcome: patient tolerated procedure well with no complications    Post-procedure details: wound care instructions given      3. MELANOCYTIC NEVUS OF TRUNK  Generalized  Scattered on the patient's face, neck, trunk, and extremities, there are multiple small, round- to oval-shaped, brown-pigmented and pink-colored, symmetric, uniform-appearing macules and dome-shaped papules  Clinically benign- to slightly atypical-appearing nevi - the clinically benign- to slightly atypical-appearing nature of the remainder of the patient's nevi was discussed with the patient today.  None of the patient's nevi, with the exception of the 2 noted above, meet threshold for biopsy today.  I emphasized the importance of performing monthly self-skin exams using the ABCDs of monitoring moles, which were reviewed with the patient today and an informational hand-out provided.  I also emphasized the importance of sun avoidance and sun protection with daily sunscreen use.  4. SEBORRHEIC KERATOSIS  Generalized  Scattered on the patient's face, neck, trunk, and extremities, there are multiple tan- to light brown-colored, hyperkeratotic, stuck-on appearing papules of varying size and shape  Seborrheic Keratoses -  the benign nature of these lesions was discussed with the patient today and reassurance provided.  No treatment is medically indicated for these lesions at this time.  5. HEMANGIOMA OF SKIN  Generalized  Scattered on the patient's face, neck, trunk, and extremities, there are multiple small, round, cherry red- to purplish-colored, symmetric, uniform, vascular-appearing macules and papules  Cherry Angiomas - the benign nature of these vascular lesions was discussed with the patient today and reassurance provided.  No treatment is medically indicated for these lesions at this time.  6. SEBORRHEIC DERMATITIS  Head - Anterior (Face)  On the patient's face, mainly the glabella and bilateral eyebrows and perinasal creases, there are pink, scaly patches with whitish-yellowish, greasy scale  Seborrheic Dermatitis - face.  The potentially chronic and intermittently flaring nature of this condition and treatment options were discussed extensively with the patient today.  At this time, I recommend topical anti-fungal therapy with Ketoconazole 2% cream, which the patient was instructed to apply twice daily to the affected areas of the face.  The risks, benefits, and side effects of this medication were discussed.  The patient expressed understanding and is in agreement with this plan.  - ketoconazole (NIZOral) 2 % cream - Head - Anterior (Face) - Apply twice daily to affected areas of face  7. HISTORY OF DYSPLASTIC NEVUS  Generalized  On the patient's left lateral lower back, there is a well-healed scar with no evidence of recurrent growth or pigmentation on exam today.  History of dysplastic nevus and actinic keratoses and photodamage.  There is no evidence of recurrence on exam today.  I emphasized the importance of continuing to perform monthly self-skin exams using the ABCDs of monitoring moles, which were reviewed with the patient, as well as the importance of sun avoidance and sun protection with daily sunscreen use.  I  will have the patient return to our office in 6 to 12 months, pending the above biopsy results, for routine follow-up and skin exam, and the patient was instructed to call our office should the patient notice any new, changing, symptomatic, or otherwise concerning skin lesions before then.  The patient expressed understanding and is in agreement with this plan.  8. DIFFUSE PHOTODAMAGE OF SKIN  Photodistributed  Diffuse photodamage with actinic changes with telangiectasia and mottled pigmentation in sun-exposed areas.  Photodamage.  The signs and symptoms of skin cancer were reviewed and the patient was advised to practice sun protection and sun avoidance, use daily sunscreen, and perform regular self skin exams.  Sun protection was discussed, including avoiding the mid-day sun, wearing a sunscreen with SPF at least 50, and stressing the need for reapplication of sunscreen and applying more than they think they need.          [1]   Current Outpatient Medications:     acetaminophen (Tylenol Extra Strength) 500 mg tablet, Take 1 tablet (500 mg) by mouth if needed. Every 4 to 6 hours, Disp: , Rfl:     allopurinol (Zyloprim) 100 mg tablet, TAKE 2 TABLETS(200 MG) BY MOUTH DAILY, Disp: 180 tablet, Rfl: 3    atorvastatin (Lipitor) 80 mg tablet, Take 1 tablet (80 mg) by mouth once daily at bedtime., Disp: 90 tablet, Rfl: 3    ezetimibe (Zetia) 10 mg tablet, Take 1 tablet (10 mg) by mouth once every day., Disp: 90 tablet, Rfl: 3    lisinopril 10 mg tablet, Take 1 tablet (10 mg) by mouth once daily., Disp: , Rfl:     multivitamin-min-iron-FA-vit K (Adults Multivitamin) 18 mg iron-400 mcg-25 mcg tablet, Take 1 tablet by mouth once daily., Disp: , Rfl:     OXcarbazepine (Trileptal) 300 mg tablet, Take by mouth. Take 1.5 tablets every morning and 1.5 tablets at bedtime, Disp: , Rfl:     ketoconazole (NIZOral) 2 % cream, Apply twice daily to affected areas of face, Disp: 60 g, Rfl: 11

## 2025-08-05 NOTE — Clinical Note
[place] Angiomas - the benign nature of these vascular lesions was discussed with the patient today and reassurance provided.  No treatment is medically indicated for these lesions at this time.

## 2025-08-05 NOTE — Clinical Note
History of dysplastic nevus and actinic keratoses and photodamage.  There is no evidence of recurrence on exam today.  I emphasized the importance of continuing to perform monthly self-skin exams using the ABCDs of monitoring moles, which were reviewed with the patient, as well as the importance of sun avoidance and sun protection with daily sunscreen use.  I will have the patient return to our office in 6 to 12 months, pending the above biopsy results, for routine follow-up and skin exam, and the patient was instructed to call our office should the patient notice any new, changing, symptomatic, or otherwise concerning skin lesions before then.  The patient expressed understanding and is in agreement with this plan.

## 2025-08-05 NOTE — Clinical Note
On the patient's left lateral lower back, there is a well-healed scar with no evidence of recurrent growth or pigmentation on exam today.

## 2025-08-06 ENCOUNTER — OFFICE VISIT (OUTPATIENT)
Dept: PULMONOLOGY | Facility: CLINIC | Age: 80
End: 2025-08-06
Payer: MEDICARE

## 2025-08-06 VITALS
BODY MASS INDEX: 20.2 KG/M2 | WEIGHT: 129 LBS | TEMPERATURE: 96.8 F | OXYGEN SATURATION: 99 % | DIASTOLIC BLOOD PRESSURE: 73 MMHG | HEART RATE: 48 BPM | RESPIRATION RATE: 18 BRPM | SYSTOLIC BLOOD PRESSURE: 120 MMHG

## 2025-08-06 DIAGNOSIS — J84.9 INTERSTITIAL LUNG DISEASE (MULTI): Primary | ICD-10-CM

## 2025-08-06 PROCEDURE — 1160F RVW MEDS BY RX/DR IN RCRD: CPT | Performed by: INTERNAL MEDICINE

## 2025-08-06 PROCEDURE — 99214 OFFICE O/P EST MOD 30 MIN: CPT | Performed by: INTERNAL MEDICINE

## 2025-08-06 PROCEDURE — 99212 OFFICE O/P EST SF 10 MIN: CPT | Performed by: INTERNAL MEDICINE

## 2025-08-06 PROCEDURE — 3074F SYST BP LT 130 MM HG: CPT | Performed by: INTERNAL MEDICINE

## 2025-08-06 PROCEDURE — 1159F MED LIST DOCD IN RCRD: CPT | Performed by: INTERNAL MEDICINE

## 2025-08-06 PROCEDURE — 3078F DIAST BP <80 MM HG: CPT | Performed by: INTERNAL MEDICINE

## 2025-08-06 ASSESSMENT — ENCOUNTER SYMPTOMS
BRUISES/BLEEDS EASILY: 0
COUGH: 0
EYE REDNESS: 0
WHEEZING: 0
FEVER: 0
HEADACHES: 0
EYE DISCHARGE: 0
DIZZINESS: 0
ADENOPATHY: 0
RHINORRHEA: 0
TREMORS: 0
NUMBNESS: 0
HEMATURIA: 0
JOINT SWELLING: 0
PALPITATIONS: 0
DIFFICULTY URINATING: 0
SLEEP DISTURBANCE: 0
ARTHRALGIAS: 1
CONSTIPATION: 0
UNEXPECTED WEIGHT CHANGE: 0
FREQUENCY: 0
NERVOUS/ANXIOUS: 0
STRIDOR: 0
NAUSEA: 0
LIGHT-HEADEDNESS: 0
SHORTNESS OF BREATH: 0
CHOKING: 0
FATIGUE: 0
SPEECH DIFFICULTY: 0
SINUS PRESSURE: 0
WEAKNESS: 0
ABDOMINAL PAIN: 0
ABDOMINAL DISTENTION: 0
SINUS PAIN: 0
AGITATION: 0
DYSURIA: 0
FACIAL SWELLING: 0
APNEA: 0

## 2025-08-06 NOTE — PATIENT INSTRUCTIONS
Would suggest follow-up pulmonary function test over the next month or so by calling [phone]     if your weight continues to decrease please check with Dr. James

## 2025-08-06 NOTE — PROGRESS NOTES
@PULMONARY FOLLOW-UP@       PROBLEM:  ILD    ASSESSMENT:  The patient is an 80-year-old with hypertension, chronic kidney disease status post nephrectomy as a child with mild to moderate regurgitation hyperlipidemia and gout.  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  His PFTs are normal except for a mildly reduced DLCO.  He had mild desaturation on his 6-minute walk..  His recent CT scan outlines stability.    PLAN:  The patient will undergo follow-up spirometry, DLCO and 6-minute walk.  He will check with his primary physician if his weight loss continues.      HISTORY OF PRESENT ILLNESS:  The patient is a 80-year-old with a history of hypertension and some chronic status post kidney disease stage III, status post nephrectomy as a child.  He also has a history of mitral valve disease and premature ventricular contraction.  He has coronary atherosclerosis without a prior myocardial infarction.  His calcification score in the past history of 64 in 2014.  He also has hyperlipidemia gout and a history of seizures.  Is also had PVCs.       A chest x-ray from December 12, 2024 revealed the following  New areas of subtle increased airspace opacity both in the right mid  lung and at the right lung base which could suggest a component of  multifocal pneumonia.      Remote granulomatous disease again noted unchanged     A CT scan of the chest from December 13, 2024 revealed the following  Interstitial lung disease with a pattern most suggestive of  nonspecific interstitial pneumonia (NSIP).  No suspicious mass, lymphadenopathy or focal consolidation.  Nonspecific 7 mm left apical nodule.     The echocardiogram from August 23, 2023 revealed the following  - The left ventricle is normal in size. There is mild concentric left ventricular   hypertrophy. Left ventricular systolic function is normal. EF = 65 ± 5% (2D   biplane)   - The right ventricle is normal in size. Right ventricular  systolic function is   normal.   -Mild to moderate MR     On February 12, 2025 I noted in consultation, the patient reports that he has no coughing congestion wheezing shortness of breath PND orthopnea.  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  He was exposed to horses and other animals over the years.  He reports no exercise limitations and he works out 4 times per week.  He has no swelling of his legs without Raynaud's or Sjogren's or any known inflammatory arthritis.  He has had no fevers or chills.  He has an unexplained weight loss of around 10 pounds over the last year.  He did stop drinking at that time in relationship to his chronic kidney disease.  He has not had any recent travel.  He does have his mitral valve disease and has been followed by cardiology as well as chronic kidney disease followed by nephrology.      On February 12, 2025 I summarized the patient is a 79-year-old with a history of hypertension, chronic kidney disease status post nephrectomy as a child. He has got mitral insufficiency, hyperlipidemia and gout. He also has a history of seizures and is on Trileptal for that. He has unexplained weight loss of 10 pounds over the last year which may be related to his cessation of alcohol intake. His review of systems is pretty much negative except for recent carpal tunnel surgery bilaterally. He has been on oxcarbazepine for seizures which is a newer form of carbazepine which in rare cases can cause interstitial pneumonitis. Also it should be noted the patient has a nonspecific 7 mm left apical nodule which should be followed. He smoked for 10 years in the remote past stopping around 1970     On March 18, 2025 I summarized the 6-minute walk outlines a walk distance of 480 m with a saturation going from 99% to 94%.  He really had no significant symptoms.  The walk distance was normal.    The PFTs outlined an FVC of 3.73 L 107% predicted with an FEV1 of 2.66 L 103% predicted  and FEV1 percent of 72%.  The TLC was 93% predicted.  The DLCO was 15.468% predicted.  This was above the lower limits of normal.    His interstitial lung disease labs are unremarkable.  The rheumatoid factor was 18 and the Citrulline antibody was normal.  Will obtain a follow-up CT scan in 3 months    On 5/12/2025 the HRCT scan of the chest revealed the following  1. Basal and subpleural predominant reticulations with scattered  areas of subpleural sparing with no significant traction  bronchiectatic changes or honeycombing. Lungs are in keeping with  interstitial lung disease. 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 There is  suggestion of some mild upper lobe predominant paraseptal  emphysematous change.  2. Stable 6 mm nodule in the left lung apex which is stable compared  to the prior examination from 12/13/2024. A 12 month follow-up CT  examination can be performed to ensure stability.  3. Severe coronary artery calcifications, indicating the presence of  coronary artery disease. If the patient has associated symptoms  recommend management as per chest pain guidelines (e.g.  https://doi.org/10.1161/CIR.7273632484552518). If the patient is  asymptomatic consider reviewing modifiable cardiovascular risk  factors and managing as per guidelines for primary prevention (e.g.  https://doi.org/10.1161/CIR.3772970667737026)  4. Additional chronic and incidental findings as above including some  punctate mitral annular calcification and additional sequela of  chronic granulomatous disease including calcified mediastinal lymph  nodes and punctate calcification in the spleen    Only, the patient is having no coughing congestion wheezing shortness of breath PND orthopnea.  [facility].  He notes no slowdown in activities.  For some unexplained reason over the last 6 weeks he is lost some weight.    RX Allergies[1]       Current Medications[2]          Review of Systems   Constitutional:   Negative for fatigue, fever and unexpected weight change.   HENT:  Negative for congestion, facial swelling, nosebleeds, postnasal drip, rhinorrhea, sinus pressure and sinus pain.    Eyes:  Negative for discharge, redness and visual disturbance.   Respiratory:  Negative for apnea, cough, choking, shortness of breath, wheezing and stridor.    Cardiovascular:  Negative for chest pain, palpitations and leg swelling.   Gastrointestinal:  Negative for abdominal distention, abdominal pain, constipation and nausea.   Endocrine: Negative for cold intolerance and heat intolerance.   Genitourinary:  Negative for difficulty urinating, dysuria, frequency and hematuria.   Musculoskeletal:  Positive for arthralgias. Negative for gait problem and joint swelling.   Allergic/Immunologic: Negative for environmental allergies, food allergies and immunocompromised state.   Neurological:  Negative for dizziness, tremors, syncope, speech difficulty, weakness, light-headedness, numbness and headaches.   Hematological:  Negative for adenopathy. Does not bruise/bleed easily.   Psychiatric/Behavioral:  Negative for agitation, behavioral problems and sleep disturbance. The patient is not nervous/anxious.         Vitals:    08/06/25 1046   BP: 120/73   Pulse: (!) 48   Resp: 18   Temp: 36 °C (96.8 °F)   SpO2: 99%        Physical Exam  Vitals reviewed.   Constitutional:       Appearance: Normal appearance.   HENT:      Head: Normocephalic and atraumatic.     Eyes:      Extraocular Movements: Extraocular movements intact.       Cardiovascular:      Rate and Rhythm: Normal rate and regular rhythm.      Heart sounds: No murmur heard.     No friction rub. No gallop.   Pulmonary:      Effort: Pulmonary effort is normal. No respiratory distress.      Breath sounds: Normal breath sounds. No stridor. No wheezing, rhonchi or rales.   Chest:      Chest wall: No tenderness.   Abdominal:      General: Abdomen is flat. There is no distension.       Palpations: Abdomen is soft. There is no mass.      Tenderness: There is no abdominal tenderness.     Musculoskeletal:         General: Normal range of motion.      Cervical back: Normal range of motion.      Right lower leg: No edema.      Left lower leg: No edema.     Skin:     General: Skin is warm and dry.     Neurological:      Mental Status: He is alert and oriented to person, place, and time.     Psychiatric:         Mood and Affect: Mood normal.         Behavior: Behavior normal.               [1] No Known Allergies  [2]   Current Outpatient Medications:     acetaminophen (Tylenol Extra Strength) 500 mg tablet, Take 1 tablet (500 mg) by mouth if needed. Every 4 to 6 hours, Disp: , Rfl:     allopurinol (Zyloprim) 100 mg tablet, TAKE 2 TABLETS(200 MG) BY MOUTH DAILY, Disp: 180 tablet, Rfl: 3    atorvastatin (Lipitor) 80 mg tablet, Take 1 tablet (80 mg) by mouth once daily at bedtime., Disp: 90 tablet, Rfl: 3    ezetimibe (Zetia) 10 mg tablet, Take 1 tablet (10 mg) by mouth once every day., Disp: 90 tablet, Rfl: 3    ketoconazole (NIZOral) 2 % cream, Apply twice daily to affected areas of face, Disp: 60 g, Rfl: 11    lisinopril 10 mg tablet, Take 1 tablet (10 mg) by mouth once daily., Disp: , Rfl:     multivitamin-min-iron-FA-vit K (Adults Multivitamin) 18 mg iron-400 mcg-25 mcg tablet, Take 1 tablet by mouth once daily., Disp: , Rfl:     OXcarbazepine (Trileptal) 300 mg tablet, Take by mouth. Take 1.5 tablets every morning and 1.5 tablets at bedtime, Disp: , Rfl:

## 2025-08-13 ENCOUNTER — TELEPHONE (OUTPATIENT)
Dept: DERMATOLOGY | Facility: CLINIC | Age: 80
End: 2025-08-13
Payer: MEDICARE

## 2025-08-29 ENCOUNTER — TELEPHONE (OUTPATIENT)
Dept: PULMONOLOGY | Facility: HOSPITAL | Age: 80
End: 2025-08-29

## 2025-08-29 ENCOUNTER — HOSPITAL ENCOUNTER (OUTPATIENT)
Dept: RESPIRATORY THERAPY | Facility: HOSPITAL | Age: 80
Discharge: HOME | End: 2025-08-29
Payer: MEDICARE

## 2025-08-29 DIAGNOSIS — J84.9 INTERSTITIAL LUNG DISEASE (MULTI): ICD-10-CM

## 2025-08-29 LAB
MGC ASCENT PFT - FEV1 - POST: 2.7
MGC ASCENT PFT - FEV1 - POST: 2.7
MGC ASCENT PFT - FEV1 - PRE: 2.65
MGC ASCENT PFT - FEV1 - PRE: 2.65
MGC ASCENT PFT - FEV1 - PREDICTED: 2.5
MGC ASCENT PFT - FEV1 - PREDICTED: 2.5
MGC ASCENT PFT - FVC - POST: 3.89
MGC ASCENT PFT - FVC - POST: 3.89
MGC ASCENT PFT - FVC - PRE: 3.98
MGC ASCENT PFT - FVC - PRE: 3.98
MGC ASCENT PFT - FVC - PREDICTED: 3.36
MGC ASCENT PFT - FVC - PREDICTED: 3.36

## 2025-08-29 PROCEDURE — 94060 EVALUATION OF WHEEZING: CPT | Performed by: INTERNAL MEDICINE

## 2025-08-29 PROCEDURE — 2500000001 HC RX 250 WO HCPCS SELF ADMINISTERED DRUGS (ALT 637 FOR MEDICARE OP): Performed by: INTERNAL MEDICINE

## 2025-08-29 PROCEDURE — 94618 PULMONARY STRESS TESTING: CPT | Performed by: INTERNAL MEDICINE

## 2025-08-29 PROCEDURE — 94729 DIFFUSING CAPACITY: CPT | Performed by: INTERNAL MEDICINE

## 2025-08-29 PROCEDURE — 94060 EVALUATION OF WHEEZING: CPT

## 2025-08-29 RX ORDER — ALBUTEROL SULFATE 90 UG/1
4 INHALANT RESPIRATORY (INHALATION) ONCE
Status: COMPLETED | OUTPATIENT
Start: 2025-08-29 | End: 2025-08-29

## 2025-08-29 RX ORDER — ALBUTEROL SULFATE 0.83 MG/ML
3 SOLUTION RESPIRATORY (INHALATION) ONCE
Status: COMPLETED | OUTPATIENT
Start: 2025-08-29 | End: 2025-08-29

## 2025-08-29 RX ADMIN — ALBUTEROL SULFATE 4 PUFF: 90 AEROSOL, METERED RESPIRATORY (INHALATION) at 07:14

## 2025-09-29 ENCOUNTER — APPOINTMENT (OUTPATIENT)
Dept: NEPHROLOGY | Facility: CLINIC | Age: 80
End: 2025-09-29
Payer: MEDICARE

## 2025-11-12 ENCOUNTER — APPOINTMENT (OUTPATIENT)
Dept: PRIMARY CARE | Facility: CLINIC | Age: 80
End: 2025-11-12
Payer: MEDICARE

## 2026-01-19 ENCOUNTER — APPOINTMENT (OUTPATIENT)
Dept: DERMATOLOGY | Facility: CLINIC | Age: 81
End: 2026-01-19
Payer: MEDICARE

## 2026-01-26 ENCOUNTER — APPOINTMENT (OUTPATIENT)
Dept: DERMATOLOGY | Facility: CLINIC | Age: 81
End: 2026-01-26
Payer: MEDICARE